# Patient Record
Sex: MALE | Race: WHITE | NOT HISPANIC OR LATINO | Employment: OTHER | URBAN - METROPOLITAN AREA
[De-identification: names, ages, dates, MRNs, and addresses within clinical notes are randomized per-mention and may not be internally consistent; named-entity substitution may affect disease eponyms.]

---

## 2023-12-25 ENCOUNTER — APPOINTMENT (OUTPATIENT)
Dept: RADIOLOGY | Facility: HOSPITAL | Age: 78
End: 2023-12-25
Payer: MEDICARE

## 2023-12-25 ENCOUNTER — HOSPITAL ENCOUNTER (OUTPATIENT)
Facility: HOSPITAL | Age: 78
Setting detail: OBSERVATION
Discharge: HOME | End: 2023-12-27
Attending: STUDENT IN AN ORGANIZED HEALTH CARE EDUCATION/TRAINING PROGRAM | Admitting: INTERNAL MEDICINE
Payer: MEDICARE

## 2023-12-25 DIAGNOSIS — R55 SYNCOPE, UNSPECIFIED SYNCOPE TYPE: Primary | ICD-10-CM

## 2023-12-25 DIAGNOSIS — I95.1 ORTHOSTATIC HYPOTENSION: ICD-10-CM

## 2023-12-25 DIAGNOSIS — R55 NEAR SYNCOPE: ICD-10-CM

## 2023-12-25 DIAGNOSIS — Z95.2 S/P TAVR (TRANSCATHETER AORTIC VALVE REPLACEMENT): Chronic | ICD-10-CM

## 2023-12-25 DIAGNOSIS — I10 PRIMARY HYPERTENSION: ICD-10-CM

## 2023-12-25 PROBLEM — I35.0 SEVERE AORTIC STENOSIS: Status: ACTIVE | Noted: 2022-01-06

## 2023-12-25 PROBLEM — I50.43 ACUTE ON CHRONIC COMBINED SYSTOLIC AND DIASTOLIC CONGESTIVE HEART FAILURE (MULTI): Status: ACTIVE | Noted: 2023-11-13

## 2023-12-25 PROBLEM — R26.89 BALANCE DISORDER: Status: ACTIVE | Noted: 2023-03-27

## 2023-12-25 PROBLEM — H43.811 POSTERIOR VITREOUS DETACHMENT OF RIGHT EYE: Status: ACTIVE | Noted: 2017-09-18

## 2023-12-25 PROBLEM — E21.3 HYPERPARATHYROIDISM (MULTI): Status: ACTIVE | Noted: 2023-03-03

## 2023-12-25 PROBLEM — Z86.711 HISTORY OF PULMONARY EMBOLISM: Status: ACTIVE | Noted: 2022-01-06

## 2023-12-25 PROBLEM — I42.2 HYPERTROPHIC CARDIOMYOPATHY (MULTI): Status: ACTIVE | Noted: 2020-01-03

## 2023-12-25 PROBLEM — N40.1 BENIGN PROSTATIC HYPERPLASIA (BPH) WITH URINARY URGENCY: Status: ACTIVE | Noted: 2018-06-21

## 2023-12-25 PROBLEM — H34.8310 BRANCH RETINAL VEIN OCCLUSION OF RIGHT EYE WITH MACULAR EDEMA (CMS-HCC): Status: ACTIVE | Noted: 2018-04-11

## 2023-12-25 PROBLEM — M80.00XA OSTEOPOROSIS WITH CURRENT PATHOLOGICAL FRACTURE: Status: ACTIVE | Noted: 2023-03-27

## 2023-12-25 PROBLEM — R39.15 BENIGN PROSTATIC HYPERPLASIA (BPH) WITH URINARY URGENCY: Status: ACTIVE | Noted: 2018-06-21

## 2023-12-25 PROBLEM — R73.03 PREDIABETES: Status: ACTIVE | Noted: 2020-01-03

## 2023-12-25 PROBLEM — I24.9 ACS (ACUTE CORONARY SYNDROME) (MULTI): Status: ACTIVE | Noted: 2022-01-06

## 2023-12-25 PROBLEM — Q21.12 PATENT FORAMEN OVALE (HHS-HCC): Status: ACTIVE | Noted: 2023-12-25

## 2023-12-25 LAB
ALBUMIN SERPL BCP-MCNC: 3.6 G/DL (ref 3.4–5)
ALP SERPL-CCNC: 41 U/L (ref 33–136)
ALT SERPL W P-5'-P-CCNC: 25 U/L (ref 10–52)
ANION GAP SERPL CALC-SCNC: 14 MMOL/L (ref 10–20)
APPEARANCE UR: CLEAR
AST SERPL W P-5'-P-CCNC: 42 U/L (ref 9–39)
BASOPHILS # BLD MANUAL: 0 X10*3/UL (ref 0–0.1)
BASOPHILS NFR BLD MANUAL: 0 %
BILIRUB SERPL-MCNC: 1.1 MG/DL (ref 0–1.2)
BILIRUB UR STRIP.AUTO-MCNC: NEGATIVE MG/DL
BUN SERPL-MCNC: 28 MG/DL (ref 6–23)
BURR CELLS BLD QL SMEAR: NORMAL
CALCIUM SERPL-MCNC: 8.9 MG/DL (ref 8.6–10.3)
CARDIAC TROPONIN I PNL SERPL HS: 19 NG/L (ref 0–20)
CARDIAC TROPONIN I PNL SERPL HS: 21 NG/L (ref 0–20)
CHLORIDE SERPL-SCNC: 103 MMOL/L (ref 98–107)
CO2 SERPL-SCNC: 22 MMOL/L (ref 21–32)
COLOR UR: YELLOW
CREAT SERPL-MCNC: 1.79 MG/DL (ref 0.5–1.3)
EOSINOPHIL # BLD MANUAL: 0 X10*3/UL (ref 0–0.4)
EOSINOPHIL NFR BLD MANUAL: 0 %
ERYTHROCYTE [DISTWIDTH] IN BLOOD BY AUTOMATED COUNT: 14.7 % (ref 11.5–14.5)
FLUAV RNA RESP QL NAA+PROBE: NOT DETECTED
FLUBV RNA RESP QL NAA+PROBE: NOT DETECTED
GFR SERPL CREATININE-BSD FRML MDRD: 39 ML/MIN/1.73M*2
GLUCOSE BLD MANUAL STRIP-MCNC: 161 MG/DL (ref 74–99)
GLUCOSE SERPL-MCNC: 168 MG/DL (ref 74–99)
GLUCOSE UR STRIP.AUTO-MCNC: NEGATIVE MG/DL
HCT VFR BLD AUTO: 35.7 % (ref 41–52)
HGB BLD-MCNC: 11.3 G/DL (ref 13.5–17.5)
IMM GRANULOCYTES # BLD AUTO: 0.01 X10*3/UL (ref 0–0.5)
IMM GRANULOCYTES NFR BLD AUTO: 0.2 % (ref 0–0.9)
INR PPP: 1.9 (ref 0.9–1.1)
KETONES UR STRIP.AUTO-MCNC: ABNORMAL MG/DL
LEUKOCYTE ESTERASE UR QL STRIP.AUTO: NEGATIVE
LYMPHOCYTES # BLD MANUAL: 1.1 X10*3/UL (ref 0.8–3)
LYMPHOCYTES NFR BLD MANUAL: 23 %
MAGNESIUM SERPL-MCNC: 1.4 MG/DL (ref 1.6–2.4)
MCH RBC QN AUTO: 31 PG (ref 26–34)
MCHC RBC AUTO-ENTMCNC: 31.7 G/DL (ref 32–36)
MCV RBC AUTO: 98 FL (ref 80–100)
MONOCYTES # BLD MANUAL: 0.41 X10*3/UL (ref 0.05–0.8)
MONOCYTES NFR BLD MANUAL: 8.5 %
MUCOUS THREADS #/AREA URNS AUTO: NORMAL /LPF
NEUTROPHILS # BLD MANUAL: 3.26 X10*3/UL (ref 1.6–5.5)
NEUTS BAND # BLD MANUAL: 0.02 X10*3/UL (ref 0–0.5)
NEUTS BAND NFR BLD MANUAL: 0.5 %
NEUTS SEG # BLD MANUAL: 3.24 X10*3/UL (ref 1.6–5)
NEUTS SEG NFR BLD MANUAL: 67.5 %
NITRITE UR QL STRIP.AUTO: NEGATIVE
NRBC BLD-RTO: 0 /100 WBCS (ref 0–0)
OVALOCYTES BLD QL SMEAR: NORMAL
PH UR STRIP.AUTO: 7 [PH]
PHOSPHATE SERPL-MCNC: 2.4 MG/DL (ref 2.5–4.9)
PLATELET # BLD AUTO: 95 X10*3/UL (ref 150–450)
POTASSIUM SERPL-SCNC: 3.5 MMOL/L (ref 3.5–5.3)
PROT SERPL-MCNC: 6.1 G/DL (ref 6.4–8.2)
PROT UR STRIP.AUTO-MCNC: NEGATIVE MG/DL
PROTHROMBIN TIME: 21.9 SECONDS (ref 9.8–12.8)
RBC # BLD AUTO: 3.64 X10*6/UL (ref 4.5–5.9)
RBC # UR STRIP.AUTO: ABNORMAL /UL
RBC #/AREA URNS AUTO: NORMAL /HPF
RBC MORPH BLD: NORMAL
SARS-COV-2 RNA RESP QL NAA+PROBE: NOT DETECTED
SCHISTOCYTES BLD QL SMEAR: NORMAL
SODIUM SERPL-SCNC: 135 MMOL/L (ref 136–145)
SP GR UR STRIP.AUTO: 1.01
TOTAL CELLS COUNTED BLD: 100
UROBILINOGEN UR STRIP.AUTO-MCNC: <2 MG/DL
VARIANT LYMPHS # BLD MANUAL: 0.02 X10*3/UL (ref 0–0.3)
VARIANT LYMPHS NFR BLD: 0.5 %
WBC # BLD AUTO: 4.8 X10*3/UL (ref 4.4–11.3)
WBC #/AREA URNS AUTO: NORMAL /HPF

## 2023-12-25 PROCEDURE — 80197 ASSAY OF TACROLIMUS: CPT | Mod: AHULAB | Performed by: PHYSICIAN ASSISTANT

## 2023-12-25 PROCEDURE — 96361 HYDRATE IV INFUSION ADD-ON: CPT | Performed by: INTERNAL MEDICINE

## 2023-12-25 PROCEDURE — 83735 ASSAY OF MAGNESIUM: CPT | Performed by: STUDENT IN AN ORGANIZED HEALTH CARE EDUCATION/TRAINING PROGRAM

## 2023-12-25 PROCEDURE — 81001 URINALYSIS AUTO W/SCOPE: CPT | Performed by: STUDENT IN AN ORGANIZED HEALTH CARE EDUCATION/TRAINING PROGRAM

## 2023-12-25 PROCEDURE — 36415 COLL VENOUS BLD VENIPUNCTURE: CPT | Performed by: STUDENT IN AN ORGANIZED HEALTH CARE EDUCATION/TRAINING PROGRAM

## 2023-12-25 PROCEDURE — 96366 THER/PROPH/DIAG IV INF ADDON: CPT | Performed by: INTERNAL MEDICINE

## 2023-12-25 PROCEDURE — 82947 ASSAY GLUCOSE BLOOD QUANT: CPT | Mod: 59

## 2023-12-25 PROCEDURE — 85027 COMPLETE CBC AUTOMATED: CPT | Mod: AHULAB | Performed by: STUDENT IN AN ORGANIZED HEALTH CARE EDUCATION/TRAINING PROGRAM

## 2023-12-25 PROCEDURE — 85027 COMPLETE CBC AUTOMATED: CPT | Performed by: STUDENT IN AN ORGANIZED HEALTH CARE EDUCATION/TRAINING PROGRAM

## 2023-12-25 PROCEDURE — 85007 BL SMEAR W/DIFF WBC COUNT: CPT | Performed by: STUDENT IN AN ORGANIZED HEALTH CARE EDUCATION/TRAINING PROGRAM

## 2023-12-25 PROCEDURE — 85610 PROTHROMBIN TIME: CPT | Performed by: STUDENT IN AN ORGANIZED HEALTH CARE EDUCATION/TRAINING PROGRAM

## 2023-12-25 PROCEDURE — 70450 CT HEAD/BRAIN W/O DYE: CPT | Performed by: STUDENT IN AN ORGANIZED HEALTH CARE EDUCATION/TRAINING PROGRAM

## 2023-12-25 PROCEDURE — 71046 X-RAY EXAM CHEST 2 VIEWS: CPT | Performed by: RADIOLOGY

## 2023-12-25 PROCEDURE — 2500000001 HC RX 250 WO HCPCS SELF ADMINISTERED DRUGS (ALT 637 FOR MEDICARE OP): Performed by: STUDENT IN AN ORGANIZED HEALTH CARE EDUCATION/TRAINING PROGRAM

## 2023-12-25 PROCEDURE — 71046 X-RAY EXAM CHEST 2 VIEWS: CPT

## 2023-12-25 PROCEDURE — 80053 COMPREHEN METABOLIC PANEL: CPT | Performed by: STUDENT IN AN ORGANIZED HEALTH CARE EDUCATION/TRAINING PROGRAM

## 2023-12-25 PROCEDURE — 99285 EMERGENCY DEPT VISIT HI MDM: CPT | Mod: 25 | Performed by: STUDENT IN AN ORGANIZED HEALTH CARE EDUCATION/TRAINING PROGRAM

## 2023-12-25 PROCEDURE — 85007 BL SMEAR W/DIFF WBC COUNT: CPT | Mod: AHULAB | Performed by: STUDENT IN AN ORGANIZED HEALTH CARE EDUCATION/TRAINING PROGRAM

## 2023-12-25 PROCEDURE — G0378 HOSPITAL OBSERVATION PER HR: HCPCS

## 2023-12-25 PROCEDURE — 96365 THER/PROPH/DIAG IV INF INIT: CPT | Mod: 59 | Performed by: INTERNAL MEDICINE

## 2023-12-25 PROCEDURE — 99222 1ST HOSP IP/OBS MODERATE 55: CPT | Performed by: PHYSICIAN ASSISTANT

## 2023-12-25 PROCEDURE — 87636 SARSCOV2 & INF A&B AMP PRB: CPT | Performed by: STUDENT IN AN ORGANIZED HEALTH CARE EDUCATION/TRAINING PROGRAM

## 2023-12-25 PROCEDURE — 84100 ASSAY OF PHOSPHORUS: CPT | Performed by: STUDENT IN AN ORGANIZED HEALTH CARE EDUCATION/TRAINING PROGRAM

## 2023-12-25 PROCEDURE — 2500000004 HC RX 250 GENERAL PHARMACY W/ HCPCS (ALT 636 FOR OP/ED): Performed by: STUDENT IN AN ORGANIZED HEALTH CARE EDUCATION/TRAINING PROGRAM

## 2023-12-25 PROCEDURE — 94760 N-INVAS EAR/PLS OXIMETRY 1: CPT

## 2023-12-25 PROCEDURE — 70450 CT HEAD/BRAIN W/O DYE: CPT

## 2023-12-25 PROCEDURE — 84484 ASSAY OF TROPONIN QUANT: CPT | Performed by: STUDENT IN AN ORGANIZED HEALTH CARE EDUCATION/TRAINING PROGRAM

## 2023-12-25 RX ORDER — ACETAMINOPHEN 325 MG/1
650 TABLET ORAL EVERY 4 HOURS PRN
Status: DISCONTINUED | OUTPATIENT
Start: 2023-12-25 | End: 2023-12-27 | Stop reason: HOSPADM

## 2023-12-25 RX ORDER — WARFARIN 3 MG/1
6 TABLET ORAL DAILY
Status: DISCONTINUED | OUTPATIENT
Start: 2023-12-26 | End: 2023-12-25

## 2023-12-25 RX ORDER — WARFARIN 3 MG/1
3 TABLET ORAL
Status: DISCONTINUED | OUTPATIENT
Start: 2023-12-29 | End: 2023-12-27 | Stop reason: HOSPADM

## 2023-12-25 RX ORDER — ROSUVASTATIN CALCIUM 10 MG/1
10 TABLET, COATED ORAL NIGHTLY
Status: DISCONTINUED | OUTPATIENT
Start: 2023-12-26 | End: 2023-12-25

## 2023-12-25 RX ORDER — MYCOPHENOLIC ACID 180 MG/1
180 TABLET, DELAYED RELEASE ORAL 2 TIMES DAILY
Status: DISCONTINUED | OUTPATIENT
Start: 2023-12-25 | End: 2023-12-27 | Stop reason: HOSPADM

## 2023-12-25 RX ORDER — CARVEDILOL 25 MG/1
25 TABLET ORAL
Status: DISCONTINUED | OUTPATIENT
Start: 2023-12-26 | End: 2023-12-27 | Stop reason: HOSPADM

## 2023-12-25 RX ORDER — ROSUVASTATIN CALCIUM 10 MG/1
10 TABLET, COATED ORAL DAILY
Status: DISCONTINUED | OUTPATIENT
Start: 2023-12-25 | End: 2023-12-25

## 2023-12-25 RX ORDER — WARFARIN 3 MG/1
3 TABLET ORAL ONCE
Status: COMPLETED | OUTPATIENT
Start: 2023-12-25 | End: 2023-12-26

## 2023-12-25 RX ORDER — CITALOPRAM 20 MG/1
20 TABLET, FILM COATED ORAL DAILY
Status: DISCONTINUED | OUTPATIENT
Start: 2023-12-25 | End: 2023-12-27 | Stop reason: HOSPADM

## 2023-12-25 RX ORDER — WARFARIN 3 MG/1
3 TABLET ORAL ONCE
Status: DISCONTINUED | OUTPATIENT
Start: 2023-12-25 | End: 2023-12-25

## 2023-12-25 RX ORDER — AMLODIPINE BESYLATE 5 MG/1
5 TABLET ORAL DAILY
Status: ON HOLD | COMMUNITY
End: 2023-12-25 | Stop reason: ALTCHOICE

## 2023-12-25 RX ORDER — PREDNISONE 5 MG/1
5 TABLET ORAL DAILY
COMMUNITY

## 2023-12-25 RX ORDER — TAMSULOSIN HYDROCHLORIDE 0.4 MG/1
0.4 CAPSULE ORAL NIGHTLY
COMMUNITY
Start: 2016-11-21

## 2023-12-25 RX ORDER — TACROLIMUS 0.5 MG/1
0.5 CAPSULE ORAL ONCE
Status: DISCONTINUED | OUTPATIENT
Start: 2023-12-25 | End: 2023-12-25

## 2023-12-25 RX ORDER — TACROLIMUS 1 MG/1
1 CAPSULE ORAL NIGHTLY
COMMUNITY

## 2023-12-25 RX ORDER — PREDNISONE 5 MG/1
5 TABLET ORAL DAILY
Status: DISCONTINUED | OUTPATIENT
Start: 2023-12-26 | End: 2023-12-27 | Stop reason: HOSPADM

## 2023-12-25 RX ORDER — PANTOPRAZOLE SODIUM 40 MG/1
40 TABLET, DELAYED RELEASE ORAL
Status: DISCONTINUED | OUTPATIENT
Start: 2023-12-26 | End: 2023-12-27 | Stop reason: HOSPADM

## 2023-12-25 RX ORDER — ACETAMINOPHEN 650 MG/1
650 SUPPOSITORY RECTAL EVERY 4 HOURS PRN
Status: DISCONTINUED | OUTPATIENT
Start: 2023-12-25 | End: 2023-12-27 | Stop reason: HOSPADM

## 2023-12-25 RX ORDER — TACROLIMUS 0.5 MG/1
0.5 CAPSULE ORAL NIGHTLY
Status: DISCONTINUED | OUTPATIENT
Start: 2023-12-26 | End: 2023-12-25

## 2023-12-25 RX ORDER — TACROLIMUS 0.5 MG/1
0.5 CAPSULE ORAL EVERY MORNING
COMMUNITY

## 2023-12-25 RX ORDER — WARFARIN 6 MG/1
6 TABLET ORAL
Status: ON HOLD | COMMUNITY
End: 2023-12-27 | Stop reason: SDUPTHER

## 2023-12-25 RX ORDER — MYCOPHENOLIC ACID 180 MG/1
180 TABLET, DELAYED RELEASE ORAL ONCE
Status: DISCONTINUED | OUTPATIENT
Start: 2023-12-25 | End: 2023-12-25

## 2023-12-25 RX ORDER — ROSUVASTATIN CALCIUM 10 MG/1
10 TABLET, COATED ORAL NIGHTLY
COMMUNITY

## 2023-12-25 RX ORDER — PANTOPRAZOLE SODIUM 40 MG/10ML
40 INJECTION, POWDER, LYOPHILIZED, FOR SOLUTION INTRAVENOUS
Status: DISCONTINUED | OUTPATIENT
Start: 2023-12-26 | End: 2023-12-27 | Stop reason: HOSPADM

## 2023-12-25 RX ORDER — WARFARIN 3 MG/1
6 TABLET ORAL
Status: DISCONTINUED | OUTPATIENT
Start: 2023-12-26 | End: 2023-12-27 | Stop reason: HOSPADM

## 2023-12-25 RX ORDER — AMLODIPINE BESYLATE 5 MG/1
5 TABLET ORAL DAILY
Status: DISCONTINUED | OUTPATIENT
Start: 2023-12-25 | End: 2023-12-25

## 2023-12-25 RX ORDER — PREDNISONE 5 MG/1
5 TABLET ORAL ONCE
Status: DISCONTINUED | OUTPATIENT
Start: 2023-12-25 | End: 2023-12-27 | Stop reason: HOSPADM

## 2023-12-25 RX ORDER — MYCOPHENOLIC ACID 180 MG/1
180 TABLET, DELAYED RELEASE ORAL 2 TIMES DAILY
COMMUNITY

## 2023-12-25 RX ORDER — TACROLIMUS 1 MG/1
1 CAPSULE ORAL ONCE
Status: DISCONTINUED | OUTPATIENT
Start: 2023-12-25 | End: 2023-12-25

## 2023-12-25 RX ORDER — WARFARIN 3 MG/1
3 TABLET ORAL 2 TIMES WEEKLY
COMMUNITY

## 2023-12-25 RX ORDER — TACROLIMUS 0.5 MG/1
0.5 CAPSULE ORAL NIGHTLY
Status: DISCONTINUED | OUTPATIENT
Start: 2023-12-25 | End: 2023-12-25

## 2023-12-25 RX ORDER — ESCITALOPRAM OXALATE 10 MG/1
20 TABLET ORAL DAILY
Status: DISCONTINUED | OUTPATIENT
Start: 2023-12-25 | End: 2023-12-25

## 2023-12-25 RX ORDER — TAMSULOSIN HYDROCHLORIDE 0.4 MG/1
0.4 CAPSULE ORAL NIGHTLY
Status: DISCONTINUED | OUTPATIENT
Start: 2023-12-25 | End: 2023-12-27 | Stop reason: HOSPADM

## 2023-12-25 RX ORDER — CARVEDILOL 25 MG/1
25 TABLET ORAL 2 TIMES DAILY
COMMUNITY

## 2023-12-25 RX ORDER — MAGNESIUM SULFATE HEPTAHYDRATE 40 MG/ML
2 INJECTION, SOLUTION INTRAVENOUS ONCE
Status: COMPLETED | OUTPATIENT
Start: 2023-12-25 | End: 2023-12-25

## 2023-12-25 RX ORDER — POLYETHYLENE GLYCOL 3350 17 G/17G
17 POWDER, FOR SOLUTION ORAL DAILY
Status: DISCONTINUED | OUTPATIENT
Start: 2023-12-25 | End: 2023-12-27 | Stop reason: HOSPADM

## 2023-12-25 RX ORDER — ESCITALOPRAM OXALATE 20 MG/1
20 TABLET ORAL DAILY
Status: ON HOLD | COMMUNITY
End: 2023-12-25 | Stop reason: ALTCHOICE

## 2023-12-25 RX ORDER — HYDRALAZINE HYDROCHLORIDE 20 MG/ML
5 INJECTION INTRAMUSCULAR; INTRAVENOUS EVERY 6 HOURS PRN
Status: DISCONTINUED | OUTPATIENT
Start: 2023-12-25 | End: 2023-12-27 | Stop reason: HOSPADM

## 2023-12-25 RX ORDER — CITALOPRAM 20 MG/1
20 TABLET, FILM COATED ORAL DAILY
COMMUNITY
Start: 2023-12-06

## 2023-12-25 RX ORDER — CARVEDILOL 25 MG/1
25 TABLET ORAL ONCE
Status: COMPLETED | OUTPATIENT
Start: 2023-12-25 | End: 2023-12-26

## 2023-12-25 RX ORDER — ROSUVASTATIN CALCIUM 10 MG/1
10 TABLET, COATED ORAL NIGHTLY
Status: DISCONTINUED | OUTPATIENT
Start: 2023-12-25 | End: 2023-12-25 | Stop reason: SDUPTHER

## 2023-12-25 RX ORDER — PREDNISONE 5 MG/1
5 TABLET ORAL DAILY
Status: DISCONTINUED | OUTPATIENT
Start: 2023-12-25 | End: 2023-12-25

## 2023-12-25 RX ORDER — TACROLIMUS 0.5 MG/1
0.5 CAPSULE ORAL DAILY
Status: DISCONTINUED | OUTPATIENT
Start: 2023-12-26 | End: 2023-12-27 | Stop reason: HOSPADM

## 2023-12-25 RX ORDER — AMLODIPINE BESYLATE 5 MG/1
5 TABLET ORAL DAILY
Status: DISCONTINUED | OUTPATIENT
Start: 2023-12-26 | End: 2023-12-25

## 2023-12-25 RX ORDER — ACETAMINOPHEN 160 MG/5ML
650 SOLUTION ORAL EVERY 4 HOURS PRN
Status: DISCONTINUED | OUTPATIENT
Start: 2023-12-25 | End: 2023-12-27 | Stop reason: HOSPADM

## 2023-12-25 RX ORDER — SULFAMETHOXAZOLE AND TRIMETHOPRIM 400; 80 MG/1; MG/1
1 TABLET ORAL 3 TIMES WEEKLY
Status: ON HOLD | COMMUNITY
Start: 2017-08-19 | End: 2023-12-26 | Stop reason: ENTERED-IN-ERROR

## 2023-12-25 RX ORDER — TALC
3 POWDER (GRAM) TOPICAL DAILY
Status: DISCONTINUED | OUTPATIENT
Start: 2023-12-25 | End: 2023-12-27 | Stop reason: HOSPADM

## 2023-12-25 RX ORDER — ROSUVASTATIN CALCIUM 10 MG/1
10 TABLET, COATED ORAL NIGHTLY
Status: DISCONTINUED | OUTPATIENT
Start: 2023-12-25 | End: 2023-12-27 | Stop reason: HOSPADM

## 2023-12-25 RX ORDER — TACROLIMUS 1 MG/1
1 CAPSULE ORAL NIGHTLY
Status: DISCONTINUED | OUTPATIENT
Start: 2023-12-25 | End: 2023-12-27 | Stop reason: HOSPADM

## 2023-12-25 RX ORDER — ACETAMINOPHEN 500 MG
1 TABLET ORAL DAILY
COMMUNITY
Start: 2015-01-12

## 2023-12-25 RX ADMIN — POTASSIUM PHOSPHATE, MONOBASIC 500 MG: 500 TABLET, SOLUBLE ORAL at 15:03

## 2023-12-25 RX ADMIN — SODIUM CHLORIDE, POTASSIUM CHLORIDE, SODIUM LACTATE AND CALCIUM CHLORIDE 1000 ML: 600; 310; 30; 20 INJECTION, SOLUTION INTRAVENOUS at 13:03

## 2023-12-25 RX ADMIN — MAGNESIUM SULFATE HEPTAHYDRATE 2 G: 40 INJECTION, SOLUTION INTRAVENOUS at 14:34

## 2023-12-25 SDOH — SOCIAL STABILITY: SOCIAL INSECURITY: ARE YOU OR HAVE YOU BEEN THREATENED OR ABUSED PHYSICALLY, EMOTIONALLY, OR SEXUALLY BY ANYONE?: NO

## 2023-12-25 SDOH — SOCIAL STABILITY: SOCIAL INSECURITY: DO YOU FEEL ANYONE HAS EXPLOITED OR TAKEN ADVANTAGE OF YOU FINANCIALLY OR OF YOUR PERSONAL PROPERTY?: NO

## 2023-12-25 SDOH — SOCIAL STABILITY: SOCIAL INSECURITY: HAS ANYONE EVER THREATENED TO HURT YOUR FAMILY OR YOUR PETS?: NO

## 2023-12-25 SDOH — SOCIAL STABILITY: SOCIAL INSECURITY: WERE YOU ABLE TO COMPLETE ALL THE BEHAVIORAL HEALTH SCREENINGS?: YES

## 2023-12-25 SDOH — SOCIAL STABILITY: SOCIAL INSECURITY: ARE THERE ANY APPARENT SIGNS OF INJURIES/BEHAVIORS THAT COULD BE RELATED TO ABUSE/NEGLECT?: NO

## 2023-12-25 SDOH — SOCIAL STABILITY: SOCIAL INSECURITY: DO YOU FEEL UNSAFE GOING BACK TO THE PLACE WHERE YOU ARE LIVING?: NO

## 2023-12-25 SDOH — SOCIAL STABILITY: SOCIAL INSECURITY: DOES ANYONE TRY TO KEEP YOU FROM HAVING/CONTACTING OTHER FRIENDS OR DOING THINGS OUTSIDE YOUR HOME?: NO

## 2023-12-25 SDOH — SOCIAL STABILITY: SOCIAL INSECURITY: ABUSE: ADULT

## 2023-12-25 SDOH — SOCIAL STABILITY: SOCIAL INSECURITY: HAVE YOU HAD THOUGHTS OF HARMING ANYONE ELSE?: NO

## 2023-12-25 ASSESSMENT — COLUMBIA-SUICIDE SEVERITY RATING SCALE - C-SSRS
1. IN THE PAST MONTH, HAVE YOU WISHED YOU WERE DEAD OR WISHED YOU COULD GO TO SLEEP AND NOT WAKE UP?: NO
2. HAVE YOU ACTUALLY HAD ANY THOUGHTS OF KILLING YOURSELF?: NO
6. HAVE YOU EVER DONE ANYTHING, STARTED TO DO ANYTHING, OR PREPARED TO DO ANYTHING TO END YOUR LIFE?: NO

## 2023-12-25 ASSESSMENT — COGNITIVE AND FUNCTIONAL STATUS - GENERAL
MOBILITY SCORE: 24
DAILY ACTIVITIY SCORE: 24
PATIENT BASELINE BEDBOUND: NO

## 2023-12-25 ASSESSMENT — ACTIVITIES OF DAILY LIVING (ADL)
HEARING - RIGHT EAR: FUNCTIONAL
ADEQUATE_TO_COMPLETE_ADL: YES
LACK_OF_TRANSPORTATION: NO
GROOMING: INDEPENDENT
TOILETING: INDEPENDENT
JUDGMENT_ADEQUATE_SAFELY_COMPLETE_DAILY_ACTIVITIES: YES
DRESSING YOURSELF: INDEPENDENT
PATIENT'S MEMORY ADEQUATE TO SAFELY COMPLETE DAILY ACTIVITIES?: YES
WALKS IN HOME: INDEPENDENT
BATHING: INDEPENDENT
FEEDING YOURSELF: INDEPENDENT
HEARING - LEFT EAR: FUNCTIONAL

## 2023-12-25 ASSESSMENT — LIFESTYLE VARIABLES
SKIP TO QUESTIONS 9-10: 1
HOW MANY STANDARD DRINKS CONTAINING ALCOHOL DO YOU HAVE ON A TYPICAL DAY: PATIENT DOES NOT DRINK
SUBSTANCE_ABUSE_PAST_12_MONTHS: NO
AUDIT-C TOTAL SCORE: 0
AUDIT-C TOTAL SCORE: 0
HOW OFTEN DO YOU HAVE 6 OR MORE DRINKS ON ONE OCCASION: NEVER
HOW OFTEN DO YOU HAVE A DRINK CONTAINING ALCOHOL: NEVER
PRESCIPTION_ABUSE_PAST_12_MONTHS: NO

## 2023-12-25 ASSESSMENT — PATIENT HEALTH QUESTIONNAIRE - PHQ9
1. LITTLE INTEREST OR PLEASURE IN DOING THINGS: NOT AT ALL
SUM OF ALL RESPONSES TO PHQ9 QUESTIONS 1 & 2: 0
2. FEELING DOWN, DEPRESSED OR HOPELESS: NOT AT ALL

## 2023-12-25 ASSESSMENT — PAIN - FUNCTIONAL ASSESSMENT: PAIN_FUNCTIONAL_ASSESSMENT: 0-10

## 2023-12-25 ASSESSMENT — PAIN SCALES - GENERAL: PAINLEVEL_OUTOF10: 0 - NO PAIN

## 2023-12-25 NOTE — H&P
History Of Present Illness  Lamont Cueto is a 78 y.o. 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 Patient reports started with diarrhea 4 days ago, described as water. 3-4 episodes per day over the last 3 days, not bloody or black. Had episodes of vomiting as well which have since resolved. He reports reduced oral intake. No fever, sore throat, congestion. Does endorse chills. Today, he was lying down in bed and went to get up when he suddenly felt very weak 'as if he was going to pass out'. Son was there and helped him to the floor. He did not lose consciousness but did feel weak and confused. Denies preceding chest pain, palpitations, sob. Denies headache, diplopia, dysphagia, unilateral weakness. Does report some word finding difficulty. Generally feeling improved since coming to ED.     ED workup: EKG NST rate 70, T wave inversion V5 V6 QTc 507; CXR no acute cardiopulmonary findings; CT H pending Trop 19, 21; CBC no leukocytosis, hgb 11.3, thrombocytopenia 95; CMP Cr 1.79, AST 42, K 3.5, Mg 1.4, glucose 168; INR 1.9, UA negative    Past Medical History  History reviewed. No pertinent past medical history.    Surgical History  History reviewed. No pertinent surgical history.     Social History  He has no history on file for tobacco use, alcohol use, and drug use.    Family History  No family history on file.     Allergies  Patient has no known allergies.    Review of Systems     Physical Exam     Last Recorded Vitals  Blood pressure (!) 170/94, pulse 76, resp. rate 16, SpO2 98 %.    Relevant Results    Scheduled medications  carvedilol, 25 mg, oral, Once  lactated Ringer's, 1,000 mL, intravenous, Once  mycophenolate, 180 mg, oral, Once  potassium phosphate (monobasic), 500 mg, oral, 4x daily  predniSONE, 5 mg, oral, Once  tacrolimus, 1 mg, oral, Once  warfarin, 3 mg, oral,  Once      Continuous medications     PRN medications  Results for orders placed or performed during the hospital encounter of 12/25/23 (from the past 24 hour(s))   CBC and Auto Differential   Result Value Ref Range    WBC 4.8 4.4 - 11.3 x10*3/uL    nRBC 0.0 0.0 - 0.0 /100 WBCs    RBC 3.64 (L) 4.50 - 5.90 x10*6/uL    Hemoglobin 11.3 (L) 13.5 - 17.5 g/dL    Hematocrit 35.7 (L) 41.0 - 52.0 %    MCV 98 80 - 100 fL    MCH 31.0 26.0 - 34.0 pg    MCHC 31.7 (L) 32.0 - 36.0 g/dL    RDW 14.7 (H) 11.5 - 14.5 %    Platelets 95 (L) 150 - 450 x10*3/uL    Immature Granulocytes %, Automated 0.2 0.0 - 0.9 %    Immature Granulocytes Absolute, Automated 0.01 0.00 - 0.50 x10*3/uL   Comprehensive metabolic panel   Result Value Ref Range    Glucose 168 (H) 74 - 99 mg/dL    Sodium 135 (L) 136 - 145 mmol/L    Potassium 3.5 3.5 - 5.3 mmol/L    Chloride 103 98 - 107 mmol/L    Bicarbonate 22 21 - 32 mmol/L    Anion Gap 14 10 - 20 mmol/L    Urea Nitrogen 28 (H) 6 - 23 mg/dL    Creatinine 1.79 (H) 0.50 - 1.30 mg/dL    eGFR 39 (L) >60 mL/min/1.73m*2    Calcium 8.9 8.6 - 10.3 mg/dL    Albumin 3.6 3.4 - 5.0 g/dL    Alkaline Phosphatase 41 33 - 136 U/L    Total Protein 6.1 (L) 6.4 - 8.2 g/dL    AST 42 (H) 9 - 39 U/L    Bilirubin, Total 1.1 0.0 - 1.2 mg/dL    ALT 25 10 - 52 U/L   Phosphorus   Result Value Ref Range    Phosphorus 2.4 (L) 2.5 - 4.9 mg/dL   Magnesium   Result Value Ref Range    Magnesium 1.40 (L) 1.60 - 2.40 mg/dL   Troponin I, High Sensitivity   Result Value Ref Range    Troponin I, High Sensitivity 19 0 - 20 ng/L   Protime-INR   Result Value Ref Range    Protime 21.9 (H) 9.8 - 12.8 seconds    INR 1.9 (H) 0.9 - 1.1   Manual Differential   Result Value Ref Range    Neutrophils %, Manual 67.5 40.0 - 80.0 %    Bands %, Manual 0.5 0.0 - 5.0 %    Lymphocytes %, Manual 23.0 13.0 - 44.0 %    Monocytes %, Manual 8.5 2.0 - 10.0 %    Eosinophils %, Manual 0.0 0.0 - 6.0 %    Basophils %, Manual 0.0 0.0 - 2.0 %    Atypical Lymphocytes %, Manual  0.5 0.0 - 2.0 %    Seg Neutrophils Absolute, Manual 3.24 1.60 - 5.00 x10*3/uL    Bands Absolute, Manual 0.02 0.00 - 0.50 x10*3/uL    Lymphocytes Absolute, Manual 1.10 0.80 - 3.00 x10*3/uL    Monocytes Absolute, Manual 0.41 0.05 - 0.80 x10*3/uL    Eosinophils Absolute, Manual 0.00 0.00 - 0.40 x10*3/uL    Basophils Absolute, Manual 0.00 0.00 - 0.10 x10*3/uL    Atypical Lymphs Absolute, Manual 0.02 0.00 - 0.30 x10*3/uL    Total Cells Counted 100     Neutrophils Absolute, Manual 3.26 1.60 - 5.50 x10*3/uL    RBC Morphology See Below     RBC Fragments Few     Ovalocytes Few     Marion Cells Few    POCT GLUCOSE   Result Value Ref Range    POCT Glucose 161 (H) 74 - 99 mg/dL   Sars-CoV-2 and Influenza A/B PCR   Result Value Ref Range    Flu A Result Not Detected Not Detected    Flu B Result Not Detected Not Detected    Coronavirus 2019, PCR Not Detected Not Detected   Urinalysis with Reflex Microscopic   Result Value Ref Range    Color, Urine Yellow Straw, Yellow    Appearance, Urine Clear Clear    Specific Gravity, Urine 1.008 1.005 - 1.035    pH, Urine 7.0 5.0, 5.5, 6.0, 6.5, 7.0, 7.5, 8.0    Protein, Urine NEGATIVE NEGATIVE mg/dL    Glucose, Urine NEGATIVE NEGATIVE mg/dL    Blood, Urine MODERATE (2+) (A) NEGATIVE    Ketones, Urine 5 (TRACE) (A) NEGATIVE mg/dL    Bilirubin, Urine NEGATIVE NEGATIVE    Urobilinogen, Urine <2.0 <2.0 mg/dL    Nitrite, Urine NEGATIVE NEGATIVE    Leukocyte Esterase, Urine NEGATIVE NEGATIVE   Microscopic Only, Urine   Result Value Ref Range    WBC, Urine NONE 1-5, NONE /HPF    RBC, Urine NONE NONE, 1-2, 3-5 /HPF    Mucus, Urine 1+ Reference range not established. /LPF   Troponin I, High Sensitivity   Result Value Ref Range    Troponin I, High Sensitivity 21 (H) 0 - 20 ng/L     XR chest 2 views    Result Date: 12/25/2023  Interpreted By:  Gilles José, STUDY: XR CHEST 2 VIEWS;  12/25/2023 1:45 pm   INDICATION: Signs/Symptoms:near syncope, hx TAVR, HF, renal transplant.   COMPARISON: None.    ACCESSION NUMBER(S): [id number]   ORDERING CLINICIAN: MEGHAN STAPLETON   FINDINGS:         CARDIOMEDIASTINAL SILHOUETTE: Cardiomediastinal silhouette is normal in size and configuration. Mitral annulus calcifications.   LUNGS: Linear atelectasis fibrosis left lung base lateral costophrenic sulcus.   ABDOMEN: No remarkable upper abdominal findings.   BONES: No acute osseous changes.       1.  Blunting of the left lateral costophrenic sulcus may reflect miniscule of fibrosis or atelectasis. Otherwise no acute findings.       MACRO: None   Signed by: Gilles José 12/25/2023 2:05 PM Dictation workstation:   [id number]            Assessment/Plan   Principal Problem:    Near syncope  Lamont Cueto is a 78 y.o. 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 Patient reports started with diarrhea 4 days ago, described as water. 3-4 episodes per day over the last 3 days, not bloody or black. Had episodes of vomiting as well which have since resolved. He reports reduced oral intake. No fever, sore throat, congestion. Does endorse chills. Today, he was lying down in bed and went to get up when he suddenly felt very weak 'as if he was going to pass out'. Son was there and helped him to the floor. He did not lose consciousness but did feel weak and confused. Denies preceding chest pain, palpitations, sob. Denies headache, diplopia, dysphagia, unilateral weakness. Does report some word finding difficulty. Generally feeling improved since coming to ED.     ED workup: EKG NST rate 70, T wave inversion V5 V6 QTc 507; CXR no acute cardiopulmonary findings; CT H pending Trop 19, 21; CBC no leukocytosis, hgb 11.3, thrombocytopenia 95; CMP Cr 1.79, AST 42, K 3.5, Mg 1.4, glucose 168; INR 1.9, UA negative    Near syncope  Diarrhea + Weakness  Systolic HF   CAD  Aortic stenosis s/p TAVR  Elevated troponin   PFO  -High  risk for cardiogenic syncope given cardiac history, prolonged Qtc on EKG. Also high risk for cardiogenic syncope given known PFO, however patient is on coumadin and no focal neurologic deficits on exam. Vasovagal/dehydration also possible given recent diarrheal illness, decreased oral intake x days. Hypomagensemia hypophosphatemia may be contributing.  -CT H no acute intracranial abnormality; encephalomalacia/gliosis noted    -MRI pending   -trop 19, 21 --> trend  -avoid Qtc prolonging meds  -orthostatic vitals  -carotid US  -update ECHO  -telemtry  -continue carvedilol  -gentle IVF, watch fluid status  -would benefit from holter on discharge   -given risk factors, EKG findings, elevated troponin, high risk for cardiogenic syncope will consult cardiology    CKD  ESRD s/p renal transplant  Hypomagensemia, hypophosphatemia  -replete elctrolytes  -avoid nephrotoxic meds  -trend BMP, lytes  -continue tacrolimus, check  level  -consider nephrology consult    MDD  -hold citalopram iso of QT prolongation    Thrombocytopenia  DVT Ppx  -Plt 95  -hold warfarin for now   -monitor             I spent > 60 minutes in the professional and overall care of this patient.      Urbano Feliciano PA-C

## 2023-12-25 NOTE — ED PROVIDER NOTES
"CC: generalized weakness, diarrhea    HPI:  Patient is a 78-year-old male with PMH of ESRD status post kidney transplant in 2012, CMV viremia and colitis (2014), history of PE on anticoagulation, history of TAVR in 2022, diverticulosis,depression, HTN, and HLD, presenting to the ED with generalized weakness and diarrhea.  Patient states he has had ongoing issues with diarrhea for a while and follows up with gastroenterology.  States that for the past couple days has had increased episodes of watery diarrhea multiple times per day and feels dehydrated.  States he went to get up to spend time with family today and felt like he was going to pass out.  Denies any associated chest pain or palpitations.  No full loss of consciousness fall or head strike.  States he has been compliant with his antirejection medication and follow-up.  Denies any associated abdominal pain, nausea, vomiting, fever or chills.  Had a recent cold with nasal congestion, no SOB or cough.  Also endorsing back pain since his recent surgery after a back fracture but this is not new.    Per additional collateral from son at bedside - patient did have an episode where is \"passed out\" and had to be caught and lowered to the ground. No seizure like activity and no significant time of unconsciousness.,but was \"out of it\" for 30-45 sec.  Seems more confused from normal per son.      Limitations to History: None    Additional History Obtained from: EMS    Records Reviewed: Recent available ED and inpatient notes reviewed in EMR.    PMHx/PSHx:  Per HPI.   - has no past medical history on file.  - has no past surgical history on file.    Medications: Reviewed in EMR. See EMR for complete list of medications and doses.    Allergies:  Patient has no known allergies.    Social History:  - Tobacco:  has no history on file for tobacco use.   - Alcohol:  has no history on file for alcohol use.   - Illicit Drugs:  has no history on file for drug use. "   ???????????????????????????????????????????????????????????????  Triage Vitals:  T    HR 77  BP (!) 186/89  RR 21  O2 99 %      PHYSICAL EXAM:   VS: As documented in the triage note and EMR flowsheet from this visit were reviewed.  Gen: Well developed. Well nourished.  NAD.  Eyes: PERRL, EOMI. Clear scerla.  HENT: NC/AT, Mucosal membranes dry.  Neck: Supple, no cervical LAD  Resp: Non-labored breathing on RA, CTAB, no wheezes or crackles  CV: RRR, nl S1, S2, no murmurs  Abd: Soft, non-distended, non-tender, no rebound or guarding. Hyperactive bowel sounds.   Ext: no LE edema, pulses full and equal  Skin: WWP. No systemic rashes or lesions. Decreased skin turgor.   MSK: normal muscle bulk, no obvious joint swelling in extremities  Neuro:  AAOx3, speech fluent, MAEx4, no focal deficit  Psych: Maintains eye contact, Appropriate mood and affect  ???????????????????????????????????????????????????????????????  EKG:  I independently interpreted the EKG:  Regular rate. Regular rhythm.   Left axis deviation with left anterior fascicular block.   Normal WA, QRS intervals. Prolonged Qtc at 507ms.   No ST segment elevations, depressions, or T wave inversions.  Impression: NSR, no STEMI.      ED Labs/Imaging:   Labs Reviewed   CBC WITH AUTO DIFFERENTIAL - Abnormal       Result Value    WBC 4.8      nRBC 0.0      RBC 3.64 (*)     Hemoglobin 11.3 (*)     Hematocrit 35.7 (*)     MCV 98      MCH 31.0      MCHC 31.7 (*)     RDW 14.7 (*)     Platelets 95 (*)     Immature Granulocytes %, Automated 0.2      Immature Granulocytes Absolute, Automated 0.01      Narrative:     The previously reported component Neutrophils % is no longer being reported.  The previously reported component Lymphocytes % is no longer being reported.  The previously reported component Monocytes % is no longer being reported.  The previously   reported component Eosinophils % is no longer being reported.  The previously reported component Basophils % is no  longer being reported.  The previously reported component Absolute Neutrophils is no longer being reported.  The previously reported   component Absolute Lymphocytes is no longer being reported.  The previously reported component Absolute Monocytes is no longer being reported.  The previously reported component Absolute Eosinophils is no longer being reported.  The previously reported   component Absolute Basophils is no longer being reported.   COMPREHENSIVE METABOLIC PANEL - Abnormal    Glucose 168 (*)     Sodium 135 (*)     Potassium 3.5      Chloride 103      Bicarbonate 22      Anion Gap 14      Urea Nitrogen 28 (*)     Creatinine 1.79 (*)     eGFR 39 (*)     Calcium 8.9      Albumin 3.6      Alkaline Phosphatase 41      Total Protein 6.1 (*)     AST 42 (*)     Bilirubin, Total 1.1      ALT 25     PHOSPHORUS - Abnormal    Phosphorus 2.4 (*)    MAGNESIUM - Abnormal    Magnesium 1.40 (*)    PROTIME-INR - Abnormal    Protime 21.9 (*)     INR 1.9 (*)    URINALYSIS WITH REFLEX MICROSCOPIC - Abnormal    Color, Urine Yellow      Appearance, Urine Clear      Specific Gravity, Urine 1.008      pH, Urine 7.0      Protein, Urine NEGATIVE      Glucose, Urine NEGATIVE      Blood, Urine MODERATE (2+) (*)     Ketones, Urine 5 (TRACE) (*)     Bilirubin, Urine NEGATIVE      Urobilinogen, Urine <2.0      Nitrite, Urine NEGATIVE      Leukocyte Esterase, Urine NEGATIVE     TROPONIN I, HIGH SENSITIVITY - Abnormal    Troponin I, High Sensitivity 21 (*)     Narrative:     Less than 99th percentile of normal range cutoff-  Female and children under 18 years old <14 ng/L; Male <21 ng/L: Negative  Repeat testing should be performed if clinically indicated.     Female and children under 18 years old 14-50 ng/L; Male 21-50 ng/L:  Consistent with possible cardiac damage and possible increased clinical   risk. Serial measurements may help to assess extent of myocardial damage.     >50 ng/L: Consistent with cardiac damage, increased clinical  risk and  myocardial infarction. Serial measurements may help assess extent of   myocardial damage.      NOTE: Children less than 1 year old may have higher baseline troponin   levels and results should be interpreted in conjunction with the overall   clinical context.     NOTE: Troponin I testing is performed using a different   testing methodology at [facility] than at other   [facility]. Direct result comparisons should only   be made within the same method.   POCT GLUCOSE - Abnormal    POCT Glucose 161 (*)    TROPONIN I, HIGH SENSITIVITY - Normal    Troponin I, High Sensitivity 19      Narrative:     Less than 99th percentile of normal range cutoff-  Female and children under 18 years old <14 ng/L; Male <21 ng/L: Negative  Repeat testing should be performed if clinically indicated.     Female and children under 18 years old 14-50 ng/L; Male 21-50 ng/L:  Consistent with possible cardiac damage and possible increased clinical   risk. Serial measurements may help to assess extent of myocardial damage.     >50 ng/L: Consistent with cardiac damage, increased clinical risk and  myocardial infarction. Serial measurements may help assess extent of   myocardial damage.      NOTE: Children less than 1 year old may have higher baseline troponin   levels and results should be interpreted in conjunction with the overall   clinical context.     NOTE: Troponin I testing is performed using a different   testing methodology at [facility] than at other   [facility]. Direct result comparisons should only   be made within the same method.   SARS-COV-2 AND INFLUENZA A/B PCR - Normal    Flu A Result Not Detected      Flu B Result Not Detected      Coronavirus 2019, PCR Not Detected      Narrative:     This assay has received FDA Emergency Use Authorization (EUA) and  is only authorized for the duration of time that circumstances exist to justify the authorization of the emergency use of in  vitro diagnostic tests for the detection of SARS-CoV-2 virus and/or diagnosis of COVID-19 infection under section 564(b)(1) of the Act, 21 U.S.C. 360bbb-3(b)(1). Testing for SARS-CoV-2 is only recommended for patients who meet current clinical and/or epidemiological criteria as defined by federal, state, or local public health directives. This assay is an in vitro diagnostic nucleic acid amplification test for the qualitative detection of SARS-CoV-2, Influenza A, and Influenza B from nasopharyngeal specimens and has been validated for use at [facility]. Negative results do not preclude COVID-19 infections or Influenza A/B infections, and should not be used as the sole basis for diagnosis, treatment, or other management decisions. If Influenza A/B and RSV PCR results are negative, testing for Parainfluenza virus, Adenovirus and Metapneumovirus is routinely performed for [facility] [place] inpatients, and is available on other patients by placing an add-on request.    MICROSCOPIC ONLY, URINE    WBC, Urine NONE      RBC, Urine NONE      Mucus, Urine 1+     B-TYPE NATRIURETIC PEPTIDE   MANUAL DIFFERENTIAL    Neutrophils %, Manual 67.5      Bands %, Manual 0.5      Lymphocytes %, Manual 23.0      Monocytes %, Manual 8.5      Eosinophils %, Manual 0.0      Basophils %, Manual 0.0      Atypical Lymphocytes %, Manual 0.5      Seg Neutrophils Absolute, Manual 3.24      Bands Absolute, Manual 0.02      Lymphocytes Absolute, Manual 1.10      Monocytes Absolute, Manual 0.41      Eosinophils Absolute, Manual 0.00      Basophils Absolute, Manual 0.00      Atypical Lymphs Absolute, Manual 0.02      Total Cells Counted 100      Neutrophils Absolute, Manual 3.26      RBC Morphology See Below      RBC Fragments Few      Ovalocytes Few      Natalee Cells Few       XR chest 2 views   Final Result   1.  Blunting of the left lateral costophrenic sulcus may reflect   miniscule of fibrosis or  atelectasis. Otherwise no acute findings.                  MACRO:   None        Signed by: Gilles José 12/25/2023 2:05 PM   Dictation workstation:   [id number]      CT head wo IV contrast    (Results Pending)         ED Course & MDM   ED Course as of 12/25/23 1841   Mon Dec 25, 2023   1348 Creatinine(!): 1.79  Patient baseline creat per chart review is 1.7, no significant BOZENA.  [KR]      ED Course User Index  [KR] Magui Mckinnon MD         Diagnoses as of 12/25/23 1841   Syncope, unspecified syncope type           Medical Decision Making:  This is a 78yoM with above stated hx including renal transplant in 2012, aortic stenosis s/p TAVR, CAD, HTN, HLD, and PE on Warfarin presenting to the ED after a syncopal event. Patient had multiple days of water diarrhea and appears mildly dehydrated on exam but HDS and NAD. 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 Labs/vitals do not suggest dehydration but given hx GI loses and decreased PO intake, symptoms could suggest orthostatic syncope.  EKG obtained and pt connected to telemetry monitoring. No signs of ACS or arrhythmia and pt without chest pain and palpitations. Patient does have moderate to high risk of cardiac syncope given cardiac history and prolonged Qtc therefore will require admission for syncope workup. Will trend troponins while in the ED. Patient and family agreeable to plan and admitted in stable condition.     Social Determinants Limiting Care:  None identified    Disposition:  As a result of their workup, the patient will require admission to the hospital.  The patient was informed of their diagnosis.  Patient was given the opportunity to ask questions and answered them.  Patient agreed to be admitted to the hospital.    Patient seen  and discussed with attending physician.    Magui Mckinnon MD PGY3  Emergency Medicine      Procedures ? Smart[employer] last updated 12/25/2023 6:41 PM          Magui Mckinnon MD  Resident  12/27/23 [id number]

## 2023-12-26 ENCOUNTER — APPOINTMENT (OUTPATIENT)
Dept: CARDIOLOGY | Facility: HOSPITAL | Age: 78
End: 2023-12-26
Payer: MEDICARE

## 2023-12-26 ENCOUNTER — APPOINTMENT (OUTPATIENT)
Dept: RADIOLOGY | Facility: HOSPITAL | Age: 78
End: 2023-12-26
Payer: MEDICARE

## 2023-12-26 ENCOUNTER — APPOINTMENT (OUTPATIENT)
Dept: VASCULAR MEDICINE | Facility: HOSPITAL | Age: 78
End: 2023-12-26
Payer: MEDICARE

## 2023-12-26 LAB
ANION GAP SERPL CALC-SCNC: 11 MMOL/L (ref 10–20)
BUN SERPL-MCNC: 20 MG/DL (ref 6–23)
CALCIUM SERPL-MCNC: 8.5 MG/DL (ref 8.6–10.3)
CARDIAC TROPONIN I PNL SERPL HS: 29 NG/L (ref 0–20)
CHLORIDE SERPL-SCNC: 106 MMOL/L (ref 98–107)
CO2 SERPL-SCNC: 25 MMOL/L (ref 21–32)
CREAT SERPL-MCNC: 1.61 MG/DL (ref 0.5–1.3)
ERYTHROCYTE [DISTWIDTH] IN BLOOD BY AUTOMATED COUNT: 14.5 % (ref 11.5–14.5)
GFR SERPL CREATININE-BSD FRML MDRD: 44 ML/MIN/1.73M*2
GLUCOSE SERPL-MCNC: 97 MG/DL (ref 74–99)
HCT VFR BLD AUTO: 32.4 % (ref 41–52)
HGB BLD-MCNC: 10.5 G/DL (ref 13.5–17.5)
INR PPP: 2.4 (ref 0.9–1.1)
MAGNESIUM SERPL-MCNC: 1.7 MG/DL (ref 1.6–2.4)
MCH RBC QN AUTO: 31.7 PG (ref 26–34)
MCHC RBC AUTO-ENTMCNC: 32.4 G/DL (ref 32–36)
MCV RBC AUTO: 98 FL (ref 80–100)
NRBC BLD-RTO: 0 /100 WBCS (ref 0–0)
PHOSPHATE SERPL-MCNC: 2.4 MG/DL (ref 2.5–4.9)
PLATELET # BLD AUTO: 95 X10*3/UL (ref 150–450)
POTASSIUM SERPL-SCNC: 3.4 MMOL/L (ref 3.5–5.3)
PROTHROMBIN TIME: 27 SECONDS (ref 9.8–12.8)
RBC # BLD AUTO: 3.31 X10*6/UL (ref 4.5–5.9)
SODIUM SERPL-SCNC: 139 MMOL/L (ref 136–145)
TACROLIMUS BLD-MCNC: 8 NG/ML
WBC # BLD AUTO: 5.7 X10*3/UL (ref 4.4–11.3)

## 2023-12-26 PROCEDURE — 94760 N-INVAS EAR/PLS OXIMETRY 1: CPT

## 2023-12-26 PROCEDURE — 85610 PROTHROMBIN TIME: CPT | Performed by: NURSE PRACTITIONER

## 2023-12-26 PROCEDURE — 83735 ASSAY OF MAGNESIUM: CPT | Performed by: PHYSICIAN ASSISTANT

## 2023-12-26 PROCEDURE — 2500000004 HC RX 250 GENERAL PHARMACY W/ HCPCS (ALT 636 FOR OP/ED): Performed by: PHYSICIAN ASSISTANT

## 2023-12-26 PROCEDURE — 96361 HYDRATE IV INFUSION ADD-ON: CPT | Performed by: INTERNAL MEDICINE

## 2023-12-26 PROCEDURE — 96375 TX/PRO/DX INJ NEW DRUG ADDON: CPT | Performed by: INTERNAL MEDICINE

## 2023-12-26 PROCEDURE — 93880 EXTRACRANIAL BILAT STUDY: CPT

## 2023-12-26 PROCEDURE — 84100 ASSAY OF PHOSPHORUS: CPT | Performed by: PHYSICIAN ASSISTANT

## 2023-12-26 PROCEDURE — 2500000004 HC RX 250 GENERAL PHARMACY W/ HCPCS (ALT 636 FOR OP/ED): Performed by: INTERNAL MEDICINE

## 2023-12-26 PROCEDURE — 80048 BASIC METABOLIC PNL TOTAL CA: CPT | Performed by: PHYSICIAN ASSISTANT

## 2023-12-26 PROCEDURE — 36415 COLL VENOUS BLD VENIPUNCTURE: CPT | Performed by: PHYSICIAN ASSISTANT

## 2023-12-26 PROCEDURE — 2500000001 HC RX 250 WO HCPCS SELF ADMINISTERED DRUGS (ALT 637 FOR MEDICARE OP): Performed by: PHYSICIAN ASSISTANT

## 2023-12-26 PROCEDURE — 70551 MRI BRAIN STEM W/O DYE: CPT | Performed by: STUDENT IN AN ORGANIZED HEALTH CARE EDUCATION/TRAINING PROGRAM

## 2023-12-26 PROCEDURE — 99233 SBSQ HOSP IP/OBS HIGH 50: CPT | Performed by: NURSE PRACTITIONER

## 2023-12-26 PROCEDURE — 70551 MRI BRAIN STEM W/O DYE: CPT

## 2023-12-26 PROCEDURE — G0378 HOSPITAL OBSERVATION PER HR: HCPCS

## 2023-12-26 PROCEDURE — 36415 COLL VENOUS BLD VENIPUNCTURE: CPT | Performed by: NURSE PRACTITIONER

## 2023-12-26 PROCEDURE — 85027 COMPLETE CBC AUTOMATED: CPT | Performed by: PHYSICIAN ASSISTANT

## 2023-12-26 PROCEDURE — 2500000004 HC RX 250 GENERAL PHARMACY W/ HCPCS (ALT 636 FOR OP/ED): Performed by: NURSE PRACTITIONER

## 2023-12-26 PROCEDURE — 93880 EXTRACRANIAL BILAT STUDY: CPT | Performed by: SURGERY

## 2023-12-26 PROCEDURE — 93005 ELECTROCARDIOGRAM TRACING: CPT

## 2023-12-26 PROCEDURE — 84484 ASSAY OF TROPONIN QUANT: CPT | Performed by: PHYSICIAN ASSISTANT

## 2023-12-26 PROCEDURE — 96376 TX/PRO/DX INJ SAME DRUG ADON: CPT | Performed by: INTERNAL MEDICINE

## 2023-12-26 PROCEDURE — 99221 1ST HOSP IP/OBS SF/LOW 40: CPT | Performed by: INTERNAL MEDICINE

## 2023-12-26 RX ORDER — POTASSIUM CHLORIDE 20 MEQ/1
40 TABLET, EXTENDED RELEASE ORAL ONCE
Status: COMPLETED | OUTPATIENT
Start: 2023-12-26 | End: 2023-12-26

## 2023-12-26 RX ORDER — DORZOLAMIDE HYDROCHLORIDE AND TIMOLOL MALEATE 20; 5 MG/ML; MG/ML
1 SOLUTION/ DROPS OPHTHALMIC EVERY 12 HOURS
COMMUNITY

## 2023-12-26 RX ADMIN — PANTOPRAZOLE SODIUM 40 MG: 40 TABLET, DELAYED RELEASE ORAL at 06:58

## 2023-12-26 RX ADMIN — TAMSULOSIN HYDROCHLORIDE 0.4 MG: 0.4 CAPSULE ORAL at 00:42

## 2023-12-26 RX ADMIN — TAMSULOSIN HYDROCHLORIDE 0.4 MG: 0.4 CAPSULE ORAL at 22:29

## 2023-12-26 RX ADMIN — POTASSIUM PHOSPHATE, MONOBASIC 500 MG: 500 TABLET, SOLUBLE ORAL at 00:42

## 2023-12-26 RX ADMIN — CARVEDILOL 25 MG: 25 TABLET, FILM COATED ORAL at 17:31

## 2023-12-26 RX ADMIN — HYDRALAZINE HYDROCHLORIDE 5 MG: 20 INJECTION INTRAMUSCULAR; INTRAVENOUS at 22:46

## 2023-12-26 RX ADMIN — POLYETHYLENE GLYCOL 3350 17 G: 17 POWDER, FOR SOLUTION ORAL at 08:27

## 2023-12-26 RX ADMIN — POTASSIUM CHLORIDE 40 MEQ: 1500 TABLET, EXTENDED RELEASE ORAL at 14:31

## 2023-12-26 RX ADMIN — TACROLIMUS 1 MG: 1 CAPSULE ORAL at 22:29

## 2023-12-26 RX ADMIN — CARVEDILOL 25 MG: 25 TABLET, FILM COATED ORAL at 00:43

## 2023-12-26 RX ADMIN — ROSUVASTATIN 10 MG: 10 TABLET, FILM COATED ORAL at 22:30

## 2023-12-26 RX ADMIN — SODIUM CHLORIDE 500 ML: 9 INJECTION, SOLUTION INTRAVENOUS at 14:32

## 2023-12-26 RX ADMIN — CARVEDILOL 25 MG: 25 TABLET, FILM COATED ORAL at 08:27

## 2023-12-26 RX ADMIN — TACROLIMUS 0.5 MG: 0.5 CAPSULE ORAL at 09:25

## 2023-12-26 RX ADMIN — TACROLIMUS 1 MG: 1 CAPSULE ORAL at 00:40

## 2023-12-26 RX ADMIN — WARFARIN SODIUM 6 MG: 3 TABLET ORAL at 17:34

## 2023-12-26 RX ADMIN — ROSUVASTATIN 10 MG: 10 TABLET, FILM COATED ORAL at 00:41

## 2023-12-26 RX ADMIN — SODIUM CHLORIDE 500 ML: 9 INJECTION, SOLUTION INTRAVENOUS at 00:44

## 2023-12-26 RX ADMIN — POTASSIUM PHOSPHATE, MONOBASIC 500 MG: 500 TABLET, SOLUBLE ORAL at 06:58

## 2023-12-26 RX ADMIN — PREDNISONE 5 MG: 5 TABLET ORAL at 08:27

## 2023-12-26 RX ADMIN — WARFARIN SODIUM 3 MG: 3 TABLET ORAL at 00:41

## 2023-12-26 RX ADMIN — MYCOPHENOLIC ACID 180 MG: 180 TABLET, DELAYED RELEASE ORAL at 00:44

## 2023-12-26 RX ADMIN — MYCOPHENOLIC ACID 180 MG: 180 TABLET, DELAYED RELEASE ORAL at 09:25

## 2023-12-26 RX ADMIN — HYDRALAZINE HYDROCHLORIDE 5 MG: 20 INJECTION INTRAMUSCULAR; INTRAVENOUS at 00:43

## 2023-12-26 RX ADMIN — Medication 3 MG: at 22:29

## 2023-12-26 RX ADMIN — MYCOPHENOLIC ACID 180 MG: 180 TABLET, DELAYED RELEASE ORAL at 22:28

## 2023-12-26 ASSESSMENT — PAIN - FUNCTIONAL ASSESSMENT
PAIN_FUNCTIONAL_ASSESSMENT: 0-10

## 2023-12-26 ASSESSMENT — COGNITIVE AND FUNCTIONAL STATUS - GENERAL
EATING MEALS: A LITTLE
DRESSING REGULAR UPPER BODY CLOTHING: A LITTLE
CLIMB 3 TO 5 STEPS WITH RAILING: A LITTLE
HELP NEEDED FOR BATHING: A LITTLE
TURNING FROM BACK TO SIDE WHILE IN FLAT BAD: A LITTLE
DAILY ACTIVITIY SCORE: 18
WALKING IN HOSPITAL ROOM: A LITTLE
MOBILITY SCORE: 18
PERSONAL GROOMING: A LITTLE
MOVING FROM LYING ON BACK TO SITTING ON SIDE OF FLAT BED WITH BEDRAILS: A LITTLE
MOVING TO AND FROM BED TO CHAIR: A LITTLE
DAILY ACTIVITIY SCORE: 24
CLIMB 3 TO 5 STEPS WITH RAILING: A LITTLE
TOILETING: A LITTLE
STANDING UP FROM CHAIR USING ARMS: A LITTLE
MOBILITY SCORE: 23
DRESSING REGULAR LOWER BODY CLOTHING: A LITTLE

## 2023-12-26 ASSESSMENT — PAIN SCALES - GENERAL
PAINLEVEL_OUTOF10: 0 - NO PAIN

## 2023-12-26 ASSESSMENT — ACTIVITIES OF DAILY LIVING (ADL): LACK_OF_TRANSPORTATION: NO

## 2023-12-26 NOTE — PROGRESS NOTES
"Lamont Cueto is a 78 y.o. male on day 0 of admission presenting with Near syncope.    Subjective   Awake in bed. Reports feeling better after getting IVF. Denies any SOB, CP, or dizziness.        Objective     Physical Exam  Constitutional:       Appearance: Normal appearance.   Cardiovascular:      Rate and Rhythm: Normal rate and regular rhythm.      Pulses: Normal pulses.      Heart sounds: Normal heart sounds. No murmur heard.     No gallop.   Pulmonary:      Effort: Pulmonary effort is normal. No respiratory distress.      Breath sounds: Normal breath sounds. No wheezing or rhonchi.   Abdominal:      General: Abdomen is flat. There is no distension.      Palpations: Abdomen is soft.      Tenderness: There is no abdominal tenderness. There is no guarding.   Musculoskeletal:         General: Normal range of motion.   Skin:     General: Skin is warm.      Capillary Refill: Capillary refill takes less than 2 seconds.   Neurological:      Mental Status: He is alert and oriented to person, place, and time.   Psychiatric:         Mood and Affect: Mood normal.         Thought Content: Thought content normal.         Judgment: Judgment normal.         Last Recorded Vitals  Blood pressure 168/88, pulse 79, temperature 36.6 °C (97.8 °F), resp. rate 16, height 1.727 m (5' 8\"), weight 55.8 kg (123 lb), SpO2 96 %.  Intake/Output last 3 Shifts:  I/O last 3 completed shifts:  In: 1000 (17.9 mL/kg) [IV Piggyback:1000]  Out: - (0 mL/kg)   Weight: 55.8 kg     Relevant Results    Scheduled medications  carvedilol, 25 mg, oral, BID with meals  [Held by provider] citalopram, 20 mg, oral, Daily  lactated Ringer's, 1,000 mL, intravenous, Once  melatonin, 3 mg, oral, Daily  mycophenolate, 180 mg, oral, BID  pantoprazole, 40 mg, oral, Daily before breakfast   Or  pantoprazole, 40 mg, intravenous, Daily before breakfast  polyethylene glycol, 17 g, oral, Daily  potassium phosphate (monobasic), 500 mg, oral, 4x daily  predniSONE, 5 mg, " oral, Once  predniSONE, 5 mg, oral, Daily  rosuvastatin, 10 mg, oral, Nightly  tacrolimus, 0.5 mg, oral, Daily  tacrolimus, 1 mg, oral, Nightly  tamsulosin, 0.4 mg, oral, Nightly  [START ON 12/29/2023] warfarin, 3 mg, oral, Once per day on Mon Fri  warfarin, 6 mg, oral, Once per day on Sun Tue Wed Thu Sat      Continuous medications     PRN medications  PRN medications: acetaminophen **OR** acetaminophen **OR** acetaminophen, hydrALAZINE    Results for orders placed or performed during the hospital encounter of 12/25/23 (from the past 24 hour(s))   CBC and Auto Differential   Result Value Ref Range    WBC 4.8 4.4 - 11.3 x10*3/uL    nRBC 0.0 0.0 - 0.0 /100 WBCs    RBC 3.64 (L) 4.50 - 5.90 x10*6/uL    Hemoglobin 11.3 (L) 13.5 - 17.5 g/dL    Hematocrit 35.7 (L) 41.0 - 52.0 %    MCV 98 80 - 100 fL    MCH 31.0 26.0 - 34.0 pg    MCHC 31.7 (L) 32.0 - 36.0 g/dL    RDW 14.7 (H) 11.5 - 14.5 %    Platelets 95 (L) 150 - 450 x10*3/uL    Immature Granulocytes %, Automated 0.2 0.0 - 0.9 %    Immature Granulocytes Absolute, Automated 0.01 0.00 - 0.50 x10*3/uL   Comprehensive metabolic panel   Result Value Ref Range    Glucose 168 (H) 74 - 99 mg/dL    Sodium 135 (L) 136 - 145 mmol/L    Potassium 3.5 3.5 - 5.3 mmol/L    Chloride 103 98 - 107 mmol/L    Bicarbonate 22 21 - 32 mmol/L    Anion Gap 14 10 - 20 mmol/L    Urea Nitrogen 28 (H) 6 - 23 mg/dL    Creatinine 1.79 (H) 0.50 - 1.30 mg/dL    eGFR 39 (L) >60 mL/min/1.73m*2    Calcium 8.9 8.6 - 10.3 mg/dL    Albumin 3.6 3.4 - 5.0 g/dL    Alkaline Phosphatase 41 33 - 136 U/L    Total Protein 6.1 (L) 6.4 - 8.2 g/dL    AST 42 (H) 9 - 39 U/L    Bilirubin, Total 1.1 0.0 - 1.2 mg/dL    ALT 25 10 - 52 U/L   Phosphorus   Result Value Ref Range    Phosphorus 2.4 (L) 2.5 - 4.9 mg/dL   Magnesium   Result Value Ref Range    Magnesium 1.40 (L) 1.60 - 2.40 mg/dL   Troponin I, High Sensitivity   Result Value Ref Range    Troponin I, High Sensitivity 19 0 - 20 ng/L   Protime-INR   Result Value Ref  Range    Protime 21.9 (H) 9.8 - 12.8 seconds    INR 1.9 (H) 0.9 - 1.1   Manual Differential   Result Value Ref Range    Neutrophils %, Manual 67.5 40.0 - 80.0 %    Bands %, Manual 0.5 0.0 - 5.0 %    Lymphocytes %, Manual 23.0 13.0 - 44.0 %    Monocytes %, Manual 8.5 2.0 - 10.0 %    Eosinophils %, Manual 0.0 0.0 - 6.0 %    Basophils %, Manual 0.0 0.0 - 2.0 %    Atypical Lymphocytes %, Manual 0.5 0.0 - 2.0 %    Seg Neutrophils Absolute, Manual 3.24 1.60 - 5.00 x10*3/uL    Bands Absolute, Manual 0.02 0.00 - 0.50 x10*3/uL    Lymphocytes Absolute, Manual 1.10 0.80 - 3.00 x10*3/uL    Monocytes Absolute, Manual 0.41 0.05 - 0.80 x10*3/uL    Eosinophils Absolute, Manual 0.00 0.00 - 0.40 x10*3/uL    Basophils Absolute, Manual 0.00 0.00 - 0.10 x10*3/uL    Atypical Lymphs Absolute, Manual 0.02 0.00 - 0.30 x10*3/uL    Total Cells Counted 100     Neutrophils Absolute, Manual 3.26 1.60 - 5.50 x10*3/uL    RBC Morphology See Below     RBC Fragments Few     Ovalocytes Few     Madison Cells Few    Tacrolimus level   Result Value Ref Range    Tacrolimus  8.0 <=15.0 ng/mL   POCT GLUCOSE   Result Value Ref Range    POCT Glucose 161 (H) 74 - 99 mg/dL   Sars-CoV-2 and Influenza A/B PCR   Result Value Ref Range    Flu A Result Not Detected Not Detected    Flu B Result Not Detected Not Detected    Coronavirus 2019, PCR Not Detected Not Detected   ECG 12 Lead   Result Value Ref Range    Ventricular Rate 70 BPM    Atrial Rate 70 BPM    AZ Interval 168 ms    QRS Duration 114 ms    QT Interval 470 ms    QTC Calculation(Bazett) 507 ms    P Axis 33 degrees    R Axis -59 degrees    T Axis 113 degrees    QRS Count 12 beats    Q Onset 210 ms    P Onset 126 ms    P Offset 184 ms    T Offset 445 ms    QTC Fredericia 495 ms   Urinalysis with Reflex Microscopic   Result Value Ref Range    Color, Urine Yellow Straw, Yellow    Appearance, Urine Clear Clear    Specific Gravity, Urine 1.008 1.005 - 1.035    pH, Urine 7.0 5.0, 5.5, 6.0, 6.5, 7.0, 7.5, 8.0     Protein, Urine NEGATIVE NEGATIVE mg/dL    Glucose, Urine NEGATIVE NEGATIVE mg/dL    Blood, Urine MODERATE (2+) (A) NEGATIVE    Ketones, Urine 5 (TRACE) (A) NEGATIVE mg/dL    Bilirubin, Urine NEGATIVE NEGATIVE    Urobilinogen, Urine <2.0 <2.0 mg/dL    Nitrite, Urine NEGATIVE NEGATIVE    Leukocyte Esterase, Urine NEGATIVE NEGATIVE   Microscopic Only, Urine   Result Value Ref Range    WBC, Urine NONE 1-5, NONE /HPF    RBC, Urine NONE NONE, 1-2, 3-5 /HPF    Mucus, Urine 1+ Reference range not established. /LPF   Troponin I, High Sensitivity   Result Value Ref Range    Troponin I, High Sensitivity 21 (H) 0 - 20 ng/L   Troponin I, High Sensitivity   Result Value Ref Range    Troponin I, High Sensitivity 29 (H) 0 - 20 ng/L   Magnesium   Result Value Ref Range    Magnesium 1.70 1.60 - 2.40 mg/dL   Phosphorus   Result Value Ref Range    Phosphorus 2.4 (L) 2.5 - 4.9 mg/dL   CBC   Result Value Ref Range    WBC 5.7 4.4 - 11.3 x10*3/uL    nRBC 0.0 0.0 - 0.0 /100 WBCs    RBC 3.31 (L) 4.50 - 5.90 x10*6/uL    Hemoglobin 10.5 (L) 13.5 - 17.5 g/dL    Hematocrit 32.4 (L) 41.0 - 52.0 %    MCV 98 80 - 100 fL    MCH 31.7 26.0 - 34.0 pg    MCHC 32.4 32.0 - 36.0 g/dL    RDW 14.5 11.5 - 14.5 %    Platelets 95 (L) 150 - 450 x10*3/uL   Basic metabolic panel   Result Value Ref Range    Glucose 97 74 - 99 mg/dL    Sodium 139 136 - 145 mmol/L    Potassium 3.4 (L) 3.5 - 5.3 mmol/L    Chloride 106 98 - 107 mmol/L    Bicarbonate 25 21 - 32 mmol/L    Anion Gap 11 10 - 20 mmol/L    Urea Nitrogen 20 6 - 23 mg/dL    Creatinine 1.61 (H) 0.50 - 1.30 mg/dL    eGFR 44 (L) >60 mL/min/1.73m*2    Calcium 8.5 (L) 8.6 - 10.3 mg/dL   Protime-INR   Result Value Ref Range    Protime 27.0 (H) 9.8 - 12.8 seconds    INR 2.4 (H) 0.9 - 1.1       Vascular US carotid artery duplex bilateral    Result Date: 12/26/2023             [place] [address]   Tel [phone] and Fax [phone]  Vascular Lab Report [facility] US CAROTID  ARTERY DUPLEX BILATERAL  Patient Name:     HARRISON PISANO      Reading Physician: [id number] Lacy Acosta MD Study Date:       12/26/2023          Ordering           [id number] AMINTA KIDD                                       Physician:         ROXY MRN/PID:          [id number]            Technologist:      Blessing Taylor RVT Accession#:       [id number]        Technologist 2:    Michelle Barraza VT Date of           1945 / 78      Encounter#:        [id number] Birth/Age:        years Gender:           M Admission Status: Inpatient           Location           [facility]                                       Performed:  Diagnosis/ICD: Syncope and collapse-R55 Indication:    Syncope CPT Codes:     99559 Cerebrovascular Carotid Duplex scan complete  CONCLUSIONS: Right Carotid: Findings are consistent with less than 50% stenosis of the right proximal internal carotid artery. Laminar flow seen by color Doppler. Right external carotid artery appears patent with no evidence of stenosis. The right vertebral artery is patent with antegrade flow. No evidence of hemodynamically significant stenosis in the right subclavian artery. Left Carotid: Findings are consistent with less than 50% stenosis of the left proximal internal carotid artery. Laminar flow seen by color Doppler. Left external carotid artery appears patent with no evidence of stenosis. The left vertebral artery is patent with antegrade flow. No evidence of hemodynamically significant stenosis in the left subclavian artery.  Imaging & Doppler Findings: Right Plaque Morph: The proximal right internal carotid artery demonstrates heterogenous plaque. The mid right internal carotid artery demonstrates heterogenous plaque. The proximal right common carotid artery demonstrates intimal thickening plaque. The mid right common carotid artery demonstrates intimal thickening plaque. The distal right common  carotid artery demonstrates intimal thickening plaque. Left Plaque Morph: The proximal left internal carotid artery demonstrates heterogenous plaque. The proximal left external carotid artery demonstrates heterogenous plaque.   Right                       Left   PSV      EDV                PSV     EDV 62 cm/s  9 cm/s    CCA P    93 cm/s 8 cm/s 68 cm/s  12 cm/s   CCA D    84 cm/s 17 cm/s 67 cm/s  13 cm/s   ICA P    87 cm/s 12 cm/s 65 cm/s  20 cm/s   ICA M    46 cm/s 13 cm/s 108 cm/s 28 cm/s   ICA D    82 cm/s 24 cm/s 125 cm/s 0 cm/s     ECA     85 cm/s 6 cm/s 72 cm/s  13 cm/s Vertebral  53 cm/s 15 cm/s 73 cm/s  5 cm/s  Subclavian 92 cm/s               Right Left ICA/CCA Ratio  1.0  1.0   [id number] Lacy Acosta MD Electronically signed by [id number] Lacy Acosta MD on 12/26/2023 at 12:04:34 PM  ** Final **     ECG 12 Lead    Result Date: 12/26/2023  Normal sinus rhythm Left anterior fascicular block Minimal voltage criteria for LVH, may be normal variant ( Vickey product ) T wave abnormality, consider lateral ischemia Prolonged QT Abnormal ECG No previous ECGs available    CT head wo IV contrast    Result Date: 12/25/2023  Interpreted By:  Angelica Mahajan, STUDY: CT HEAD WO IV CONTRAST;  12/25/2023 7:05 pm   INDICATION: Signs/Symptoms:confusion.   COMPARISON: None.   ACCESSION NUMBER(S): [id number]   ORDERING CLINICIAN: DOROTEO PÉREZ   TECHNIQUE: Noncontrast axial CT scan of head was performed. Angled reformats in brain and bone windows were generated. The images were reviewed in bone, brain, blood and soft tissue windows.   FINDINGS: Geographic area of cystic encephalomalacia and gliosis is present in the right frontal lobe, likely representing chronic sequela of prior infarct/injury. Gray-white differentiation is otherwise intact, without evidence of CT apparent acute transcortical infarct. Subtle attenuation changes noted in the periventricular and subcortical white matter of bilateral cerebral hemispheres  there are nonspecific, but likely represent sequela of microvascular disease.   No hyperdense intracranial hemorrhage is identified. There is no mass effect or midline shift.   Somewhat disproportionate enlargement of the supratentorial ventricular system relative to basal cisterns and sulci is nonspecific, but favored to represent asymmetric central volume loss. No abnormal ventricular dilatation is otherwise present. Basal cisterns are patent. No extra-axial fluid collections are identified.   Scalp soft tissues do not demonstrate any acute abnormality. Calvarium is unremarkable in appearance. Mastoid air cells and middle ear cavities are well aerated.   Small amount of mucus/secretions are present in the hypoplastic in the inferior right maxillary sinus.       1. No evidence of hemorrhage, acute CT apparent transcortical infarct or other emergent intracranial abnormality. 2. Geographic area of cystic encephalomalacia and gliosis is present in the right frontal lobe, likely representing sequela of late subacute or chronic infarct/injury. MRI of the brain can be considered for more definite characterization. 3. Patchy attenuation changes in the periventricular and subcortical white matter of bilateral cerebral hemispheres are nonspecific, but favored to represent changes of microvascular disease.   MACRO: None   Signed by: Angelica Mahajan 12/25/2023 7:18 PM Dictation workstation:   [id number]    XR chest 2 views    Result Date: 12/25/2023  Interpreted By:  Gilles José, STUDY: XR CHEST 2 VIEWS;  12/25/2023 1:45 pm   INDICATION: Signs/Symptoms:near syncope, hx TAVR, HF, renal transplant.   COMPARISON: None.   ACCESSION NUMBER(S): [id number]   ORDERING CLINICIAN: MEGHAN STAPLETON   FINDINGS:         CARDIOMEDIASTINAL SILHOUETTE: Cardiomediastinal silhouette is normal in size and configuration. Mitral annulus calcifications.   LUNGS: Linear atelectasis fibrosis left lung base lateral costophrenic sulcus.    ABDOMEN: No remarkable upper abdominal findings.   BONES: No acute osseous changes.       1.  Blunting of the left lateral costophrenic sulcus may reflect miniscule of fibrosis or atelectasis. Otherwise no acute findings.       MACRO: None   Signed by: Gilles José 12/25/2023 2:05 PM Dictation workstation:   [id number]     Assessment/Plan   Principal Problem:    Near syncope    Lamont Cueto is a 78 y.o. 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 Patient reports started with diarrhea 4 days ago, described as water. 3-4 episodes per day over the last 3 days, not bloody or black. Had episodes of vomiting as well which have since resolved. He reports reduced oral intake. No fever, sore throat, congestion. Does endorse chills. Today, he was lying down in bed and went to get up when he suddenly felt very weak 'as if he was going to pass out'. Son was there and helped him to the floor. He did not lose consciousness but did feel weak and confused. Denies preceding chest pain, palpitations, sob. Denies headache, diplopia, dysphagia, unilateral weakness. Does report some word finding difficulty. Generally feeling improved since coming to ED.     ED workup: EKG NST rate 70, T wave inversion V5 V6 QTc 507; CXR no acute cardiopulmonary findings; CT H pending Trop 19, 21; CBC no leukocytosis, hgb 11.3, thrombocytopenia 95; CMP Cr 1.79, AST 42, K 3.5, Mg 1.4, glucose 168; INR 1.9, UA negative    Near syncope  Diarrhea + Weakness  Systolic HF   CAD  Aortic stenosis s/p TAVR  Elevated troponin   PFO  -High risk for cardiogenic syncope given cardiac history, prolonged Qtc on EKG. Also high risk for cardiogenic syncope given known PFO, however patient is on coumadin and no focal neurologic deficits on exam. Vasovagal/dehydration also possible given recent diarrheal illness, decreased oral intake x days.  Hypomagensemia hypophosphatemia may be contributing.  -likely dehydration 2/2 to volume loss from diarrhea   -CT H no acute intracranial abnormality; encephalomalacia/gliosis noted-> subacute/ chronic infarct? Denies history of CVA     -MRI pending   -trop 19, 21 --> trend  -avoid Qtc prolonging meds  -orthostatic vitals daily   -carotid US  -update ECHO-> cardiology cancelled   -telemtry  -continue carvedilol  -gentle IVF, watch fluid status  -would benefit from holter on discharge   -given risk factors, EKG findings, elevated troponin, high risk for cardiogenic syncope will consult cardiology      Orthostatic hypotension  -/84 -> 137/76 -> 110/68  -IVF bolus ordered then recheck     CKD  ESRD s/p renal transplant  Hypomagensemia, hypophosphatemia  -replete elctrolytes  -avoid nephrotoxic meds  -trend BMP, lytes  -continue tacrolimus, check  level  -consider nephrology consult     MDD  -hold citalopram iso of QT prolongation     Thrombocytopenia  -stable at 95  -monitor      DVT prophylaxis:  Coumadin, SCD    DC plan:  DC home when medically stable    Labs/Testing reviewed    Interdisciplinary team rounding completed with hospitalist, nurse, TCC    NP discussed plan and lab/testing results with Dr. Robert. DC pending ortho VS, MRI. Ortho's positive- 500 mL IVF bolus, monitor fluid status given CHF      I spent 50  minutes in the professional and overall care of this patient.      Ada Preciado, APRN-CNP

## 2023-12-26 NOTE — CONSULTS
"Inpatient consult to Cardiology  Consult performed by: Jeri Shaikh, WISAM-CNP  Consult ordered by: Urbano Feliciano PA-C  Reason for consult: near syncope, incrased QT, increased troponin        History Of Present Illness:    Lamont Cueto is a 78 y.o. 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  Cardiology consulted for near syncope, increased Qtc, increased troponin.      States his son called EMS as patient was unsteady on his feet, and \"words would not come out of my mouth.\"  Patient this this happened as he was dehydrated.  States he has been having diarrhea for the past couple of days, with decreased oral intake.  He did vomit one time after eating chicken soup.  No fever or chills.  Denies any chest pain or angina.  He is short of breath with exertion, but this is at baseline.  No arm or leg swelling.  No syncope, headaches, or palpitations.  Denies any sick contacts or recent travel     Home cardiac medications: carvedilol 25mg BID, rosuvastatin 10mg daily, warfarin.     Follows with cardiology at [facility]:  Dr. CRUZITO Steen, last visit 10/25/2023    Past Cardiology Tests (Last 3 Years):  EKG:    EKG showing NSR, rate 70, , , Qtc 507, no ST segment changes.    Monitor 9/2022 - [facility]  Patient had a min HR of 60 bpm, max HR of 167 bpm, and avg HR of 79 bpm.  Predominant underlying rhythm was Sinus Rhythm. 7 Ventricular Tachycardia runs  occurred, the run with the fastest interval lasting 4 beats with a max rate of 167  bpm, the longest lasting 7 beats with an avg rate of 124 bpm. 21 Supraventricular  Tachycardia runs occurred, the run with the fastest interval lasting 8 beats with a  max rate of 143 bpm, the longest lasting 2 mins 0 secs with an avg rate of " 111  bpm. Some episodes of Supraventricular Tachycardia may be possible Atrial  Tachycardia with variable block. Isolated SVEs were rare (<1.0%), SVE Couplets  were rare (<1.0%), and SVE Triplets were rare (<1.0%). Isolated VEs were  occasional (1.1%, 30192), VE Couplets were rare (<1.0%, 131), and VE Triplets were  rare (<1.0%, 12). Ventricular Bigeminy and Trigeminy were present.    Echo:    TTE 7/2022 - [facility]  CONCLUSIONS:   - Technically difficult exam due to post op and suboptimal positioning.   - Exam indication: s/p TAVR   - The left ventricle is normal in size. There is severe concentric left   ventricular hypertrophy. Left ventricular systolic function is normal. EF = 61 ±   5% (2D 4-ch.) LVH may be associated with known severe AS, hypertension, or in the   correct clinical context cardiac amyloidosis or HCM (less likely).   - The right ventricle is normal in size. Right ventricular systolic function is   normal.   - The left atrial cavity is mildly dilated.   - S/P transcatheter aortic valve replacement. Godinez S3 Ultra prosthetic aortic   valve (size #26). There is no aortic valve regurgitation. The peak gradient is 15   mmHg, the mean gradient is 8 mmHg and the dimensionless valve index is 0.64. Prior    OR AV peak/mean gradient of 8/5mmHg.   - Stable small pericardial effusion.   - Exam was compared with the prior  echocardiographic exam performed on 2/23/22   and 7/25/22 ([facility]), now s/p TAVR complete study.     TTE 2/23/22 - [facility]  CONCLUSIONS:   - Exam indication: Aortic stenosis   - The left ventricle is normal in size. There is severe concentric left   ventricular hypertrophy. Left ventricular systolic function is normal. EF = 53 ±   5% (2D biplane)   - The right ventricle is normal in size. Right ventricular systolic function is   normal.   - The left atrial cavity is severely dilated. -patient has known PFO.   - The right atrial cavity is dilated.   - Tricuspid aortic valve. There is severe  aortic valve stenosis caused by   calcified valve and restricted opening. AV area is 0.64 cm² (0.38 cm²/m²) by   continuity, VTI. The peak gradient is 67 mmHg, the mean gradient is 45 mmHg and   the dimensionless valve index is 0.19.   - Exam was compared with the prior  echocardiographic exam performed on   1/10/2022. ([place] [facility]). No significant change noted     [facility] 1/2022  CONCLUSIONS:   - Exam indication: Initial evaluation of infective endocarditis with positive   blood cultures   - The left ventricle is normal in size. There is concentric left ventricular   hypertrophy. Left ventricular systolic function is normal. EF = 60 ± 5% (visual   est.)   - The right ventricle is normal in size. Right ventricular systolic function is   normal.   - No CELESTINE thrombus.   - Tricuspid aortic valve. There is severe aortic valve stenosis.   - There is a patent foramen ovale as detected by Doppler.   - There is no evidence of endocarditis.   - Exam was compared with the prior  echocardiographic exam performed on   01/06/2022. There has been no significant interval change.      TTE 1/2022  CONCLUSIONS:   - Exam indication: Chest Pain   - The left ventricle is normal in size. There is severe concentric left   ventricular hypertrophy. Left ventricular systolic function is mildly decreased.   EF = 51 ± 5% (2D 4-ch.) Definity contrast used for endocardial border detection.   Left ventricular diastolic function was not evaluated due to fused E and A waves.   - The right ventricle is normal in size. Right ventricular systolic function is   normal.   - The left atrial cavity is mildly dilated.   - Increased gradients through the MV.   - Tricuspid aortic valve. There is severe aortic valve stenosis. AV area is 0.79   cm² (0.46 cm²/m²) by continuity, VTI. The peak gradient is 71 mmHg, the mean   gradient is 51 mmHg and the dimensionless valve index is 0.23.   - Estimated right ventricular systolic pressure is likely underestimated  due to a   weak or incomplete tricuspid regurgitation signal and is, at least, 33 mmHg   consistent with normal pulmonary artery pressures. Estimated right atrial pressure    is 8 mmHg based on IVC assessment.   -RWMA noted above   - Exam was compared with the prior CC echocardiographic exam performed on 2/23/21.    Gradients previously 70/43 mmHg across AoV.         TTE 2/2021  CONCLUSIONS:   - Exam indication: Aortic stenosis   - The left ventricle is normal in size. There is severe concentric left   ventricular hypertrophy. Left ventricular systolic function is normal. EF = 62 ±   5% (2D 4-ch.)   - The right ventricle is normal in size. Right ventricular systolic function is   normal.   - The left atrial cavity is mildly dilated.   - Tricuspid aortic valve. There is severe aortic valve stenosis caused by   calcified valve and restricted opening. AV area is 0.82 cm² (0.48 cm²/m²) by   continuity, VTI. The peak gradient is 70 mmHg, the mean gradient is 43 mmHg and   the dimensionless valve index is 0.26.   -consider cardiac amyloid/infiltrative myopathy   - Exam was compared with the prior CC echocardiographic exam performed on   3/6/2018.   -AS has progressed   Previous echocardiograms have noted heavy trabeculation consistent with LV   non-compaction (though not well seen on current study)   -Previous echocardiograms have also noted patent foramen ovale though not seen on   current study      Amyloid SPECT 3/2021  CONCLUSIONS:   1. Not Consistent with TTR amyloidosis: A semi-quantitative visual score   of 0 or H/CL ratio < 1.25   2. Right iliac fossa uptake consistent with renal transplant   3. Incidental Findings from limited non-diagnostic CTAC:      - Coronary calcifications visualized.      - Aortic valve leaflet calcifications visualized. Correlation with   echocardiography suggested.        TTE 3/2018  CONCLUSIONS:   - Exam indication: Cardiac murmur   - The left ventricle is normal in size. There is severe  concentric left   ventricular hypertrophy. Left ventricular systolic function is hyperdynamic. EF =   80 ± 5% (visual est.)   - The right ventricle is normal in size. Right ventricular systolic function is   normal.   - The left atrial cavity is dilated.   -Mild AS  - Estimated right ventricular systolic pressure is 32 mmHg consistent with normal   pulmonary artery pressures. Estimated right atrial pressure is 5 mmHg.   -Previous echocardiograms have noted heavy trabeculation consistent with LV   non-compaction (though not well seen on current study)   -Previous echocardiograms have also noted patent foramen ovale though not seen on   current study   - Exam was compared with the prior CC echocardiographic exam performed on 5/2017   (stress). Compared to previous, no significant change noted.      CONCLUSIONS:   - Exam indication: PYLE   - The exercise stress echo was negative for ischemia at 95 % of MPHR (7.4 METS).   - The left ventricle is normal in size. There is moderate concentric left   ventricular hypertrophy. Left ventricular systolic function is hyperdynamic. EF =   75 ± 5% (visual est.)   - The right ventricle is normal in size. Right ventricular systolic function is   normal.   - Exam was compared with the prior CC echocardiographic exam performed on   10/4/2016. Compared to previous (surface echo only), no significant change;   limited aortic valve assessment on current study consistent with mild aortic   stenosis, valve was fully interrogated previously also noting mild aortic stenosis       TTE 10/2016  CONCLUSIONS:  - Exam indication: Cardiovascular source of embolus  - The left ventricle is normal in size. There is moderate concentric left   ventricular hypertrophy. Left ventricular systolic function is hyperdynamic. EF =   75 ± 5% (visual est.) Baseline left ventricular diastolic function is consistent   with abnormal relaxation (stage 1).  - The right ventricle is normal in size. Right ventricular  systolic function is   normal.  - The left atrial cavity is mildly dilated.  -mild AS  - Exam was compared with the prior  echocardiographic exam performed on   10/10/2012 (no significant change noted)      Echo October 2012  - The left ventricle is normal in size. There is mild concentric left ventricular   hypertrophy. Left ventricular systolic function is normal. EF = 67 ± 5% (2D   biplane)  - The right ventricle is normal in size. Right ventricular systolic function is   low normal.  - The left atrial cavity is mildly dilated.  -Previous echocardiograms have noted heavy trabeculation consistent with LV   non-compaction (though not well seen on current study)  -Previous echocardiograms have also noted patent foramen ovale though not seen on     Cath:    Cath 4/2022- [facility]  LMT: _ The LMT is normal.   LAD:   _ The LAD has mild luminal irregularities.   LCX: _ The proximal circumflex is narrowed 30 % - focal disease.   RAMUS: _ Ramus Status: Not Applicable.   RCA:   _ RCA is normal    Stress Test:    Nuc stress 1/2022 - [facility]  CONCLUSIONS:    1. SPECT Perfusion Study: Normal.    2. There is no scintigraphic evidence for inducible ischemia.    3. No evidence of scarred myocardium.    4. Left ventricle is normal in size. The left ventricle systolic   function is mildly decreased.    5. This is a low risk scan. EF 50% mildly impaired.             Gated Stress FBP    LVEF % 50     Cardiac Imaging:  No results found for this or any previous visit.      Past Medical History:  He has no past medical history on file.    Past Surgical History:  He has no past surgical history on file.      Social History:  He has no history on file for tobacco use, alcohol use, and drug use.    Family History:  No family history on file.     Allergies:  Patient has no known allergies.    ROS:  10 point review of systems including (Constitutional, Eyes, ENMT, Respiratory, Cardiac, Gastrointestinal, Neurological, Psychiatric, and Hematologic)  "was performed and is otherwise negative.    Objective Data:  Last Recorded Vitals:  Vitals:    23 1504 23 2229 23 0032 23 0830   BP: (!) 170/94 (!) 191/91 (!) 189/86 168/88   BP Location:  Right arm Right arm    Patient Position:  Lying Lying    Pulse:  75 71 79   Resp:  16 16 16   Temp:  36.7 °C (98 °F) 37.3 °C (99.1 °F) 36.6 °C (97.8 °F)   TempSrc:  Oral Oral    SpO2:  97% 98% 96%   Weight:  55.8 kg (123 lb)     Height:  1.727 m (5' 8\")       Medical Gas Therapy: None (Room air)  Weight  Av.8 kg (123 lb)  Min: 55.8 kg (123 lb)  Max: 55.8 kg (123 lb)      LABS:  CMP:  Results from last 7 days   Lab Units 23  0526 23  1246   SODIUM mmol/L 139 135*   POTASSIUM mmol/L 3.4* 3.5   CHLORIDE mmol/L 106 103   CO2 mmol/L 25 22   ANION GAP mmol/L 11 14   BUN mg/dL 20 28*   CREATININE mg/dL 1.61* 1.79*   EGFR mL/min/1.73m*2 44* 39*   MAGNESIUM mg/dL 1.70 1.40*   ALBUMIN g/dL  --  3.6   ALT U/L  --  25   AST U/L  --  42*   BILIRUBIN TOTAL mg/dL  --  1.1     CBC:  Results from last 7 days   Lab Units 23  0526 23  1246   WBC AUTO x10*3/uL 5.7 4.8   HEMOGLOBIN g/dL 10.5* 11.3*   HEMATOCRIT % 32.4* 35.7*   PLATELETS AUTO x10*3/uL 95* 95*   MCV fL 98 98     COAG:   Results from last 7 days   Lab Units 23  0659 23  1246   INR  2.4* 1.9*     ABO: No results found for: \"ABO\"  HEME/ENDO:     CARDIAC:   Results from last 7 days   Lab Units 23  0526 23  1704 23  1246   TROPHS ng/L 29* 21* 19             Last I/O:    Intake/Output Summary (Last 24 hours) at 2023 1101  Last data filed at 2023 1410  Gross per 24 hour   Intake 1000 ml   Output --   Net 1000 ml     Net IO Since Admission: 1,000 mL [23 1101]      Imaging Results:  CT head wo IV contrast    Result Date: 2023  Interpreted By:  Angelica Mahajan, STUDY: CT HEAD WO IV CONTRAST;  2023 7:05 pm   INDICATION: Signs/Symptoms:confusion.   COMPARISON: None.   ACCESSION " NUMBER(S): [id number]   ORDERING CLINICIAN: DOROTEO PÉREZ   TECHNIQUE: Noncontrast axial CT scan of head was performed. Angled reformats in brain and bone windows were generated. The images were reviewed in bone, brain, blood and soft tissue windows.   FINDINGS: Geographic area of cystic encephalomalacia and gliosis is present in the right frontal lobe, likely representing chronic sequela of prior infarct/injury. Gray-white differentiation is otherwise intact, without evidence of CT apparent acute transcortical infarct. Subtle attenuation changes noted in the periventricular and subcortical white matter of bilateral cerebral hemispheres there are nonspecific, but likely represent sequela of microvascular disease.   No hyperdense intracranial hemorrhage is identified. There is no mass effect or midline shift.   Somewhat disproportionate enlargement of the supratentorial ventricular system relative to basal cisterns and sulci is nonspecific, but favored to represent asymmetric central volume loss. No abnormal ventricular dilatation is otherwise present. Basal cisterns are patent. No extra-axial fluid collections are identified.   Scalp soft tissues do not demonstrate any acute abnormality. Calvarium is unremarkable in appearance. Mastoid air cells and middle ear cavities are well aerated.   Small amount of mucus/secretions are present in the hypoplastic in the inferior right maxillary sinus.       1. No evidence of hemorrhage, acute CT apparent transcortical infarct or other emergent intracranial abnormality. 2. Geographic area of cystic encephalomalacia and gliosis is present in the right frontal lobe, likely representing sequela of late subacute or chronic infarct/injury. MRI of the brain can be considered for more definite characterization. 3. Patchy attenuation changes in the periventricular and subcortical white matter of bilateral cerebral hemispheres are nonspecific, but favored to represent changes of  microvascular disease.   MACRO: None   Signed by: Angelica Mahajan 12/25/2023 7:18 PM Dictation workstation:   [id number]    XR chest 2 views    Result Date: 12/25/2023  Interpreted By:  Gilles José, STUDY: XR CHEST 2 VIEWS;  12/25/2023 1:45 pm   INDICATION: Signs/Symptoms:near syncope, hx TAVR, HF, renal transplant.   COMPARISON: None.   ACCESSION NUMBER(S): [id number]   ORDERING CLINICIAN: MEGHAN STAPLETON   FINDINGS:         CARDIOMEDIASTINAL SILHOUETTE: Cardiomediastinal silhouette is normal in size and configuration. Mitral annulus calcifications.   LUNGS: Linear atelectasis fibrosis left lung base lateral costophrenic sulcus.   ABDOMEN: No remarkable upper abdominal findings.   BONES: No acute osseous changes.       1.  Blunting of the left lateral costophrenic sulcus may reflect miniscule of fibrosis or atelectasis. Otherwise no acute findings.       MACRO: None   Signed by: Gilles José 12/25/2023 2:05 PM Dictation workstation:   [id number]      Inpatient Medications:  Scheduled medications   Medication Dose Route Frequency    carvedilol  25 mg oral BID with meals    [Held by provider] citalopram  20 mg oral Daily    lactated Ringer's  1,000 mL intravenous Once    melatonin  3 mg oral Daily    mycophenolate  180 mg oral BID    pantoprazole  40 mg oral Daily before breakfast    Or    pantoprazole  40 mg intravenous Daily before breakfast    polyethylene glycol  17 g oral Daily    potassium phosphate (monobasic)  500 mg oral 4x daily    predniSONE  5 mg oral Once    predniSONE  5 mg oral Daily    rosuvastatin  10 mg oral Nightly    tacrolimus  0.5 mg oral Daily    tacrolimus  1 mg oral Nightly    tamsulosin  0.4 mg oral Nightly    [Held by provider] warfarin  3 mg oral Once per day on Mon Fri    [Held by provider] warfarin  6 mg oral Once per day on Sun Tue Wed Thu Sat     PRN medications   Medication    acetaminophen    Or    acetaminophen    Or    acetaminophen    hydrALAZINE     Continuous  Medications   Medication Dose Last Rate       Outpatient Medications:  Current Outpatient Medications   Medication Instructions    carvedilol (COREG) 25 mg, oral, 2 times daily    cholecalciferol (Vitamin D-3) 50 mcg (2,000 unit) capsule 1 capsule, oral, Daily    citalopram (CELEXA) 20 mg, oral, Daily    mycophenolate (MYFORTIC) 180 mg, oral, 2 times daily, Takes at 1000 every morning    peppermint oiL capsule,delayed release(DR/EC) 1 capsule, oral, Daily    predniSONE (DELTASONE) 5 mg, oral, Daily    rosuvastatin (CRESTOR) 10 mg, oral, Nightly    sulfamethoxazole-trimethoprim (Bactrim) 400-80 mg tablet 1 tablet, oral, 3 times weekly, Monday, Wednesday, Friday    tacrolimus (PROGRAF) 0.5 mg, oral, Every morning    tacrolimus (PROGRAF) 1 mg, oral, Nightly, Takes at 1100 every morning    tamsulosin (FLOMAX) 0.4 mg, oral, Nightly, Pt takes at 1900 every evening    warfarin (COUMADIN) 3 mg, oral, 2 times weekly, Monday and Friday - Takes at 1900 every evening <BR>    warfarin (COUMADIN) 6 mg, oral, 5 times weekly, Tuesday, Wednesday, Thursday, Saturday, Sunday - Takes at 1900 at night       Physical Exam:    General: Elderly, thin, in no acute distress  HEENT: Normocephalic, atraumatic  Neck: Supple, JVP is normal negative hepatojugular reflux 2+ carotid pulses without bruit  Pulmonary: Normal respiratory effort, clear to auscultation  Cardiovascular: Normal S1 and S2, no murmurs rubs or gallops  Abdomen: Flat, soft, nontender, nondistended  Extremities: Warm without edema, 2+ radial pulses bilaterally . Bilateral hand tremors  Neurologic: Alert and oriented x3  Psychiatric: Normal mood and affect        Assessment/Plan     Lamont Cueto is a 78 y.o. 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  Cardiology consulted for near syncope, increased Qtc, increased troponin.     EKG showing NSR, rate 70, , , Qtc 507, no ST segment changes.  BP up on admit to 190/80, still hypertensive, but improving.  Mag 1.4 > 1.7,.  HS troponin 19, 21.  INR 1.9.  There are no significant clinical signs / symptoms or ischemic changes consistent with ACS.  No chest pain    Assessment:  # Near syncope in the setting of decreased oral intake , diarrheal illness: dehydration - ? Viral illness  # Hypertension, uncontrolled  # Severe aortic stenosis, s/p TAVR  # Prolonged QTC  # PFO ( unlikely contributing to near syncope, in the setting of above   # h/p PE on warfarin  # Non obstructive CAD  # Acute on chronic CKD s/p renal transplant 2012  # Hypomagnesemia  # Elevated troponin, flat trend.  Low level elevation with a flat trend consistent with a non-MI troponin elevation / chronic non-traumatic myocardial injury in the setting of above. Core measures do not apply.     Plan:  - Echo ordered by primary team  - Obtain orthostatic VS  - Agree with gentle hydration  - Monitor electrolytes closely.  Goals K > 4, Mag >2.   - Continue warfarin for h/o PE, goal INR 2-3.  - Continue carvedilol 25mg BID, and rosuvastatin 10mg daily  - Avoid QT prolonging medications     Follow up with established cardiologist, Dr. CRUZITO Steen at [facility]    Code Status:  Full Code

## 2023-12-26 NOTE — PROGRESS NOTES
Pharmacy Medication History Review    Lamont Cueto is a 78 y.o. male admitted for Near syncope. Pharmacy reviewed the patient's [id number] medications and allergies for accuracy.    The list below reflectives the updated PTA list. Please review each medication in order reconciliation for additional clarification and justification.  .  Prior to Admission Medications   Prescriptions Last Dose Informant Patient Reported? Taking?   carvedilol (Coreg) 25 mg tablet 12/25/2023  Yes Yes   Sig: Take 1 tablet (25 mg) by mouth 2 times a day.   cholecalciferol (Vitamin D-3) 50 mcg (2,000 unit) capsule 12/25/2023  Yes Yes   Sig: Take 1 capsule (50 mcg) by mouth once daily.   citalopram (CeleXA) 20 mg tablet 12/25/2023  Yes Yes   Sig: Take 1 tablet (20 mg) by mouth once daily.   dorzolamide-timoloL (Cosopt) 22.3-6.8 mg/mL ophthalmic solution 12/25/2023  Yes No   Sig: Administer 1 drop into the right eye every 12 hours.   mycophenolate (Myfortic) 180 mg EC tablet 12/25/2023  Yes Yes   Sig: Take 1 tablet (180 mg) by mouth 2 times a day. Takes at 1000 every morning   peppermint oiL capsule,delayed release(DR/EC) 12/25/2023  Yes Yes   Sig: Take 1 capsule by mouth once daily.   predniSONE (Deltasone) 5 mg tablet 12/25/2023  Yes Yes   Sig: Take 1 tablet (5 mg) by mouth once daily.   rosuvastatin (Crestor) 10 mg tablet 12/24/2023  Yes Yes   Sig: Take 1 tablet (10 mg) by mouth once daily at bedtime.   tacrolimus (Prograf) 0.5 mg capsule 12/25/2023  Yes Yes   Sig: Take 1 capsule (0.5 mg) by mouth once daily in the morning.   tacrolimus (Prograf) 1 mg capsule 12/25/2023  Yes Yes   Sig: Take 1 capsule (1 mg) by mouth once daily at bedtime. Takes at 1100 every morning   tamsulosin (Flomax) 0.4 mg 24 hr capsule 12/24/2023  Yes Yes   Sig: Take 1 capsule (0.4 mg) by mouth once daily at bedtime. Pt takes at 1900 every evening   warfarin (Coumadin) 3 mg tablet 12/25/2023  Yes Yes   Sig: Take 1 tablet (3 mg) by mouth 2 times a week.  Monday and Friday - Takes at 1900 every evening   warfarin (Coumadin) 6 mg tablet 12/24/2023  Yes Yes   Sig: Take 1 tablet (6 mg) by mouth 5 times a week. Tuesday, Wednesday, Thursday, Saturday, Sunday - Takes at 1900 at night      Facility-Administered Medications: None         The list below reflectives the updated allergy list. Please review each documented allergy for additional clarification and justification.  Allergies  Reviewed by Magui Mckinnon MD on 12/25/2023   No Known Allergies         Below are additional concerns with the patient's PTA list.  Patient verified all medications and doses.    Whit Agarwal CPhT

## 2023-12-26 NOTE — PROGRESS NOTES
Lamont Cueto is a 78 y.o. male on day 0 of admission presenting with Near syncope.  Met with patient  Lives wife, denies any homegoing needs  Aware he is obs status  Chrissy Felix RN

## 2023-12-27 VITALS
HEART RATE: 78 BPM | WEIGHT: 123 LBS | HEIGHT: 68 IN | TEMPERATURE: 97.6 F | RESPIRATION RATE: 18 BRPM | BODY MASS INDEX: 18.64 KG/M2 | DIASTOLIC BLOOD PRESSURE: 67 MMHG | SYSTOLIC BLOOD PRESSURE: 131 MMHG | OXYGEN SATURATION: 100 %

## 2023-12-27 PROBLEM — I95.1 ORTHOSTATIC HYPOTENSION: Status: RESOLVED | Noted: 2023-12-27 | Resolved: 2023-12-27

## 2023-12-27 PROBLEM — I42.2 HYPERTROPHIC CARDIOMYOPATHY (MULTI): Chronic | Status: ACTIVE | Noted: 2020-01-03

## 2023-12-27 PROBLEM — N40.1 BENIGN PROSTATIC HYPERPLASIA (BPH) WITH URINARY URGENCY: Status: RESOLVED | Noted: 2018-06-21 | Resolved: 2023-12-27

## 2023-12-27 PROBLEM — I50.43 ACUTE ON CHRONIC COMBINED SYSTOLIC AND DIASTOLIC CONGESTIVE HEART FAILURE (MULTI): Chronic | Status: ACTIVE | Noted: 2023-11-13

## 2023-12-27 PROBLEM — I50.42 CHRONIC COMBINED SYSTOLIC AND DIASTOLIC CONGESTIVE HEART FAILURE (MULTI): Status: ACTIVE | Noted: 2023-11-13

## 2023-12-27 PROBLEM — R39.15 BENIGN PROSTATIC HYPERPLASIA (BPH) WITH URINARY URGENCY: Status: RESOLVED | Noted: 2018-06-21 | Resolved: 2023-12-27

## 2023-12-27 PROBLEM — I35.0 SEVERE AORTIC STENOSIS: Chronic | Status: ACTIVE | Noted: 2022-01-06

## 2023-12-27 PROBLEM — Z95.2 S/P TAVR (TRANSCATHETER AORTIC VALVE REPLACEMENT): Chronic | Status: ACTIVE | Noted: 2022-07-25

## 2023-12-27 PROBLEM — I95.1 ORTHOSTATIC HYPOTENSION: Status: ACTIVE | Noted: 2023-12-27

## 2023-12-27 PROBLEM — R55 NEAR SYNCOPE: Status: RESOLVED | Noted: 2023-12-25 | Resolved: 2023-12-27

## 2023-12-27 LAB
ANION GAP SERPL CALC-SCNC: 12 MMOL/L (ref 10–20)
BUN SERPL-MCNC: 19 MG/DL (ref 6–23)
CALCIUM SERPL-MCNC: 8.7 MG/DL (ref 8.6–10.3)
CHLORIDE SERPL-SCNC: 108 MMOL/L (ref 98–107)
CO2 SERPL-SCNC: 24 MMOL/L (ref 21–32)
CREAT SERPL-MCNC: 1.46 MG/DL (ref 0.5–1.3)
ERYTHROCYTE [DISTWIDTH] IN BLOOD BY AUTOMATED COUNT: 14.6 % (ref 11.5–14.5)
GFR SERPL CREATININE-BSD FRML MDRD: 49 ML/MIN/1.73M*2
GLUCOSE BLD MANUAL STRIP-MCNC: 103 MG/DL (ref 74–99)
GLUCOSE BLD MANUAL STRIP-MCNC: 143 MG/DL (ref 74–99)
GLUCOSE SERPL-MCNC: 93 MG/DL (ref 74–99)
HCT VFR BLD AUTO: 32.6 % (ref 41–52)
HGB BLD-MCNC: 10.2 G/DL (ref 13.5–17.5)
INR PPP: 3.4 (ref 0.9–1.1)
MCH RBC QN AUTO: 30.6 PG (ref 26–34)
MCHC RBC AUTO-ENTMCNC: 31.3 G/DL (ref 32–36)
MCV RBC AUTO: 98 FL (ref 80–100)
NRBC BLD-RTO: 0 /100 WBCS (ref 0–0)
PLATELET # BLD AUTO: 120 X10*3/UL (ref 150–450)
POTASSIUM SERPL-SCNC: 3.7 MMOL/L (ref 3.5–5.3)
PROTHROMBIN TIME: 38.4 SECONDS (ref 9.8–12.8)
RBC # BLD AUTO: 3.33 X10*6/UL (ref 4.5–5.9)
SODIUM SERPL-SCNC: 140 MMOL/L (ref 136–145)
WBC # BLD AUTO: 5.3 X10*3/UL (ref 4.4–11.3)

## 2023-12-27 PROCEDURE — 2500000004 HC RX 250 GENERAL PHARMACY W/ HCPCS (ALT 636 FOR OP/ED): Performed by: PHYSICIAN ASSISTANT

## 2023-12-27 PROCEDURE — 36415 COLL VENOUS BLD VENIPUNCTURE: CPT | Performed by: NURSE PRACTITIONER

## 2023-12-27 PROCEDURE — G0378 HOSPITAL OBSERVATION PER HR: HCPCS

## 2023-12-27 PROCEDURE — 82947 ASSAY GLUCOSE BLOOD QUANT: CPT

## 2023-12-27 PROCEDURE — 80048 BASIC METABOLIC PNL TOTAL CA: CPT | Performed by: NURSE PRACTITIONER

## 2023-12-27 PROCEDURE — 85610 PROTHROMBIN TIME: CPT | Performed by: NURSE PRACTITIONER

## 2023-12-27 PROCEDURE — 2500000004 HC RX 250 GENERAL PHARMACY W/ HCPCS (ALT 636 FOR OP/ED): Performed by: INTERNAL MEDICINE

## 2023-12-27 PROCEDURE — 2500000001 HC RX 250 WO HCPCS SELF ADMINISTERED DRUGS (ALT 637 FOR MEDICARE OP): Performed by: PHYSICIAN ASSISTANT

## 2023-12-27 PROCEDURE — 96376 TX/PRO/DX INJ SAME DRUG ADON: CPT | Performed by: INTERNAL MEDICINE

## 2023-12-27 PROCEDURE — 99231 SBSQ HOSP IP/OBS SF/LOW 25: CPT | Performed by: NURSE PRACTITIONER

## 2023-12-27 PROCEDURE — 85027 COMPLETE CBC AUTOMATED: CPT | Performed by: NURSE PRACTITIONER

## 2023-12-27 RX ORDER — WARFARIN 6 MG/1
6 TABLET ORAL
Start: 2023-12-27

## 2023-12-27 RX ADMIN — PANTOPRAZOLE SODIUM 40 MG: 40 TABLET, DELAYED RELEASE ORAL at 06:57

## 2023-12-27 RX ADMIN — MYCOPHENOLIC ACID 180 MG: 180 TABLET, DELAYED RELEASE ORAL at 10:55

## 2023-12-27 RX ADMIN — HYDRALAZINE HYDROCHLORIDE 5 MG: 20 INJECTION INTRAMUSCULAR; INTRAVENOUS at 09:11

## 2023-12-27 RX ADMIN — PREDNISONE 5 MG: 5 TABLET ORAL at 10:55

## 2023-12-27 RX ADMIN — TACROLIMUS 0.5 MG: 0.5 CAPSULE ORAL at 10:55

## 2023-12-27 RX ADMIN — CARVEDILOL 25 MG: 25 TABLET, FILM COATED ORAL at 10:55

## 2023-12-27 ASSESSMENT — COGNITIVE AND FUNCTIONAL STATUS - GENERAL
MOBILITY SCORE: 23
CLIMB 3 TO 5 STEPS WITH RAILING: A LITTLE
DAILY ACTIVITIY SCORE: 24

## 2023-12-27 ASSESSMENT — PAIN SCALES - GENERAL
PAINLEVEL_OUTOF10: 0 - NO PAIN

## 2023-12-27 ASSESSMENT — PAIN - FUNCTIONAL ASSESSMENT
PAIN_FUNCTIONAL_ASSESSMENT: 0-10

## 2023-12-27 NOTE — DISCHARGE INSTRUCTIONS
"Please monitor blood pressure at home and keep a record, bring to PCP for review     Sometimes dizziness can be caused by a drop in blood pressure that can happen to some people when they stand up. This drop in blood pressure can make you feel lightheaded. It can even make you pass out.     The symptoms all happen when you stand up after sitting or lying down. They can include:  ?Feeling lightheaded or dizzy  ?Blurry or dim vision  ?Weakness  ?Fainting or passing out    There are many things that can cause this. It can happen if:  ? There is not enough fluid in your blood vessels - This can happen if you lose a lot of blood or if you are dehydrated. You can get dehydrated if:  You do not drink enough fluids.  You have severe diarrhea or vomiting.  You sweat a lot (for example, during exercise).  ? Your heart doesn't pump out enough blood.  ? The nerves and hormones in your body that control the blood vessels are not working properly.  ? You take certain medicines that can cause lower blood pressure - These include those used for:  High blood pressure  Chest pain caused by heart disease (called \"angina\")    Some things you can do to help with your symptoms include:  ?Changes to your diet - For example, eating more salt and drinking more water might help some people. If you have heart failure, you should not do this.   ?If you are dehydrated because of excessive sweating, vomiting, or diarrhea, it is important to increase your fluid intake to make up for the fluid you lost. Make sure that you drink enough fluids, especially in hot weather.  ?Lifestyle changes - These can include things like changing how you sit, learning to stand up slowly, or avoiding hot and humid weather.  ?Stand up slowly and give your body time to adapt. This is especially important when you get out of bed in the morning. Start by sitting up and waiting a moment. Then, swing your legs over the side of the bed and wait some more. When you do stand " up, make sure you have something to hold onto in case you start to feel dizzy.  ?If you have symptoms after eating, it might help to make some changes to your eating habits. For example, you can try to avoid large meals, eat meals that are low in carbohydrates, and drink water with your food. Stand up slowly when you get up from the table.  ?Limit activities that could make you overheat or sweat a lot. Examples include taking hot showers, running, or hiking.   ?Use extra pillows or an adjustable bed to make the head of your bed higher. This will raise your head above your heart slightly.  ?Avoid drinking a lot of alcohol.    Dizziness and lightheadedness can lead to falls and injuries. Go to the ER if you fall and hit your head or injure yourself, especially if you are on blood thinners.

## 2023-12-27 NOTE — CARE PLAN
The patient's goals for the shift include to get some rest    The clinical goals for the shift include patient will remain safe and ring for asssitance this shift

## 2023-12-27 NOTE — NURSING NOTE

## 2024-01-07 LAB
ATRIAL RATE: 70 BPM
P AXIS: 33 DEGREES
P OFFSET: 184 MS
P ONSET: 126 MS
PR INTERVAL: 168 MS
Q ONSET: 210 MS
QRS COUNT: 12 BEATS
QRS DURATION: 114 MS
QT INTERVAL: 470 MS
QTC CALCULATION(BAZETT): 507 MS
QTC FREDERICIA: 495 MS
R AXIS: -59 DEGREES
T AXIS: 113 DEGREES
T OFFSET: 445 MS
VENTRICULAR RATE: 70 BPM

## 2024-01-13 NOTE — DISCHARGE SUMMARY
Discharge Diagnosis  Near syncope    Discharge Meds     Your medication list        CHANGE how you take these medications        Instructions Last Dose Given Next Dose Due   warfarin 3 mg tablet  Commonly known as: Coumadin  What changed: Another medication with the same name was changed. Make sure you understand how and when to take each.           warfarin 6 mg tablet  Commonly known as: Coumadin  What changed: additional instructions      Take 1 tablet (6 mg) by mouth 5 times a week. Tuesday, Wednesday, Thursday, Saturday, Sunday - Takes at 1900 at night.  HOLD TODAY ON 12/27 DUE TO INR 3.4              CONTINUE taking these medications        Instructions Last Dose Given Next Dose Due   carvedilol 25 mg tablet  Commonly known as: Coreg           cholecalciferol 50 mcg (2,000 unit) capsule  Commonly known as: Vitamin D-3           citalopram 20 mg tablet  Commonly known as: CeleXA           dorzolamide-timoloL 22.3-6.8 mg/mL ophthalmic solution  Commonly known as: Cosopt           mycophenolate 180 mg EC tablet  Commonly known as: Myfortic           peppermint oiL capsule,delayed release(DR/EC)           predniSONE 5 mg tablet  Commonly known as: Deltasone           rosuvastatin 10 mg tablet  Commonly known as: Crestor           tacrolimus 0.5 mg capsule  Commonly known as: Prograf           tacrolimus 1 mg capsule  Commonly known as: Prograf           tamsulosin 0.4 mg 24 hr capsule  Commonly known as: Flomax                     Where to Get Your Medications        Information about where to get these medications is not yet available    Ask your nurse or doctor about these medications  warfarin 6 mg tablet         Test Results Pending At Discharge  Pending Labs       Order Current Status    B-Type Natriuretic Peptide Collected (12/25/23 1246)        Hospital Course  78 y.o. 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   ED workup: EKG NST rate 70, T wave inversion V5 V6 QTc 507; CXR no acute cardiopulmonary findings; CTH/MRI stable, Trop 19, 21; CBC no leukocytosis, hgb 11.3, thrombocytopenia 95; CMP Cr 1.79, AST 42, K 3.5, Mg 1.4, glucose 168; INR 1.9, UA negative.  Admitted for observation, seen by cardiology, cardiac etiology ruled out, near syncope in the setting of volume depletion following a bout of diarrhea. Orthostatics positive - IV hydration administered, pt improved and cleared for discharge.     Pertinent Physical Exam At Time of Discharge  Constitutional: NAD, alert and cooperative  Eyes: no icterus  ENMT: mucous membranes moist, no lesions  Head/Neck: supple  Respiratory/Thorax: CTA bilaterally, non-labored breathing, no cough, on RA  Cardiovascular: RRR, no murmurs heard  Gastrointestinal: ND/S/NT  : no Hoffman, no SP/flank discomfort  Musculoskeletal: no joint swelling, ROM intact  Extremities: no edema  Neurological: non-focal  Skin: warm and dry  Psych: calm, stable mood     Outpatient Follow-Up  No future appointments.      Dolores Moon, APRN-CNP